# Patient Record
Sex: MALE | Race: WHITE | NOT HISPANIC OR LATINO | Employment: FULL TIME | URBAN - METROPOLITAN AREA
[De-identification: names, ages, dates, MRNs, and addresses within clinical notes are randomized per-mention and may not be internally consistent; named-entity substitution may affect disease eponyms.]

---

## 2017-01-16 ENCOUNTER — TELEPHONE (OUTPATIENT)
Dept: SPORTS MEDICINE | Facility: CLINIC | Age: 32
End: 2017-01-16

## 2017-01-16 RX ORDER — TIZANIDINE HYDROCHLORIDE 4 MG/1
4 CAPSULE, GELATIN COATED ORAL EVERY 8 HOURS PRN
Qty: 30 CAPSULE | Refills: 0 | Status: SHIPPED | OUTPATIENT
Start: 2017-01-16 | End: 2017-03-08 | Stop reason: SDUPTHER

## 2017-01-16 NOTE — TELEPHONE ENCOUNTER
PATIENT WAS DOING SOME REMODELING AND PULLED MUSCLES IN HIS LOWER BACK. HE IS WANTING TO KNOW IF YOU WILL CALL HIM IN SOMETHING OR IF HE HAS TO BE SEEN.

## 2017-02-15 ENCOUNTER — OFFICE VISIT (OUTPATIENT)
Dept: SPORTS MEDICINE | Facility: CLINIC | Age: 32
End: 2017-02-15

## 2017-02-15 VITALS
WEIGHT: 170 LBS | HEART RATE: 78 BPM | OXYGEN SATURATION: 98 % | DIASTOLIC BLOOD PRESSURE: 60 MMHG | RESPIRATION RATE: 16 BRPM | SYSTOLIC BLOOD PRESSURE: 102 MMHG | BODY MASS INDEX: 24.34 KG/M2 | HEIGHT: 70 IN

## 2017-02-15 DIAGNOSIS — G89.29 CHRONIC MIDLINE LOW BACK PAIN WITHOUT SCIATICA: ICD-10-CM

## 2017-02-15 DIAGNOSIS — M54.50 CHRONIC MIDLINE LOW BACK PAIN WITHOUT SCIATICA: ICD-10-CM

## 2017-02-15 DIAGNOSIS — R10.13 DYSPEPSIA: Primary | ICD-10-CM

## 2017-02-15 DIAGNOSIS — L40.9 PSORIASIS: ICD-10-CM

## 2017-02-15 PROCEDURE — 99214 OFFICE O/P EST MOD 30 MIN: CPT | Performed by: FAMILY MEDICINE

## 2017-02-15 RX ORDER — BETAMETHASONE DIPROPIONATE 0.5 MG/G
CREAM TOPICAL 2 TIMES DAILY
Qty: 45 G | Refills: 2 | Status: SHIPPED | OUTPATIENT
Start: 2017-02-15 | End: 2020-09-15

## 2017-02-15 RX ORDER — ALUMINUM ZIRCONIUM OCTACHLOROHYDREX GLY 16 G/100G
1 GEL TOPICAL DAILY
Qty: 28 CAPSULE | Refills: 0 | COMMUNITY
Start: 2017-02-15 | End: 2017-12-06

## 2017-02-15 RX ORDER — PREDNISONE 10 MG/1
TABLET ORAL
Qty: 30 TABLET | Refills: 0 | Status: SHIPPED | OUTPATIENT
Start: 2017-02-15 | End: 2017-04-03 | Stop reason: SDUPTHER

## 2017-02-15 NOTE — PROGRESS NOTES
"Genesis Cullen is a 31 y.o. male.   Chief Complaint   Patient presents with   • Abdominal Pain     bowel movements are not as frequent or normal as usual   • Back Pain     wants lower back rechecked       History of Present Illness   1.  Last week patient had an episode of lower abdominal cramping and diarrhea, lasted for about 24 hours, no fever or chills, no melena or hematochezia.  Since that time has noted more regular stools but only occurring every 2-3 days, also has noted increased gas.  2.  About a month ago patient was doing some remodeling in his new home, swelling a sledgehammer and noted afterwards that he had some fairly significant low back pain that was nonradiating.  He called our office and on 1/16/2017 he was sent in a prescription for Zanaflex.  Patient states his back is quite a bit better however there still a nagging dull ache in the low back especially towards in the day.  Still no radiation.    3.  Patient needs a refill on Diprolene cream for patches of psoriasis on his elbows.    I have reviewed the patient's medical history in detail and updated the computerized patient record.        Review of Systems   Constitutional: Negative.    HENT: Negative.    Respiratory: Negative.    Cardiovascular: Negative.    Gastrointestinal:        Per history of present illness   Genitourinary: Negative.    Musculoskeletal: Positive for back pain.   Skin:        Per history of present illness   Neurological: Negative.    Psychiatric/Behavioral: Negative.      Visit Vitals   • /60 (BP Location: Left arm, Patient Position: Sitting, Cuff Size: Adult)   • Pulse 78   • Resp 16   • Ht 70\" (177.8 cm)   • Wt 170 lb (77.1 kg)   • SpO2 98%   • BMI 24.39 kg/m2         Objective   Physical Exam   Constitutional: He is oriented to person, place, and time. He appears well-developed and well-nourished.   HENT:   Head: Normocephalic and atraumatic.   Mouth/Throat: Oropharynx is clear and moist.   Eyes: " Conjunctivae and EOM are normal. Pupils are equal, round, and reactive to light.   Cardiovascular: Normal rate and regular rhythm.    No peripheral edema   Pulmonary/Chest: Effort normal and breath sounds normal. He has no wheezes.   Abdominal: Soft. Bowel sounds are normal. He exhibits no distension. There is no tenderness.   Musculoskeletal:   Examination lumbar spine normal in general appearance.  There is some mild discomfort with back extension, midline.  No radiating pain.  Mild discomfort with forward flexion to approximately 90° and then extension.   Neurological: He is alert and oriented to person, place, and time.   Skin: Skin is warm and dry.   Patches of dry silvery scaly skin with a red base just distal to both elbows.   Psychiatric: He has a normal mood and affect. His behavior is normal.   Vitals reviewed.      Assessment/Plan   Robert was seen today for abdominal pain and back pain.    Diagnoses and all orders for this visit:    Dyspepsia  -     Probiotic Product (ALIGN) capsule; Take 1 capsule by mouth Daily.    Chronic midline low back pain without sciatica  -     predniSONE (DELTASONE) 10 MG tablet; 4 tabs qd x 3 d, then 3 tabs qd x 3 d, then 2 tabs qd x 3 d, then 1 tab qd  x 3 d    Psoriasis  -     betamethasone dipropionate (DIPROLENE) 0.05 % cream; Apply  topically 2 (Two) Times a Day.      1.  Dyspepsia, we'll treat with a probiotic however if his symptoms do not resolve or become a recurring issue he will need to be sent for colonoscopy to rule out IBD (patient with a history of psoriasis).  2.  Mechanical low back pain, follow-up 2 weeks if no change.  3.  Psoriasis, follow-up one month if no change.

## 2017-03-08 RX ORDER — TIZANIDINE HYDROCHLORIDE 4 MG/1
4 CAPSULE, GELATIN COATED ORAL EVERY 8 HOURS PRN
Qty: 30 CAPSULE | Refills: 0 | Status: SHIPPED | OUTPATIENT
Start: 2017-03-08 | End: 2018-09-18

## 2017-03-13 ENCOUNTER — TELEPHONE (OUTPATIENT)
Dept: SPORTS MEDICINE | Facility: CLINIC | Age: 32
End: 2017-03-13

## 2017-03-14 RX ORDER — TRIAMCINOLONE ACETONIDE 0.25 MG/G
CREAM TOPICAL 2 TIMES DAILY
Qty: 80 G | Refills: 1 | Status: SHIPPED | OUTPATIENT
Start: 2017-03-14 | End: 2017-12-06

## 2017-03-14 NOTE — TELEPHONE ENCOUNTER
I called and told him you will not fill Vanos and that you can refer him to dermatology. But he is asking if you will call in something different. The one med was over $100 and insurance would not cover it.

## 2017-04-03 ENCOUNTER — TELEPHONE (OUTPATIENT)
Dept: SPORTS MEDICINE | Facility: CLINIC | Age: 32
End: 2017-04-03

## 2017-04-03 DIAGNOSIS — M54.50 CHRONIC MIDLINE LOW BACK PAIN WITHOUT SCIATICA: ICD-10-CM

## 2017-04-03 DIAGNOSIS — G89.29 CHRONIC MIDLINE LOW BACK PAIN WITHOUT SCIATICA: ICD-10-CM

## 2017-04-03 RX ORDER — PREDNISONE 10 MG/1
TABLET ORAL
Qty: 30 TABLET | Refills: 0 | Status: SHIPPED | OUTPATIENT
Start: 2017-04-03 | End: 2017-12-06

## 2017-04-03 NOTE — TELEPHONE ENCOUNTER
Pt would like refill on prednisone for his back that he was given a couple of months ago, he thinks he pulled it helping his parents move a tv this weekend

## 2017-09-14 ENCOUNTER — TREATMENT (OUTPATIENT)
Dept: PHYSICAL THERAPY | Facility: CLINIC | Age: 32
End: 2017-09-14

## 2017-09-14 DIAGNOSIS — R51.9 HEADACHE, UNSPECIFIED HEADACHE TYPE: Primary | ICD-10-CM

## 2017-09-14 PROCEDURE — 97110 THERAPEUTIC EXERCISES: CPT | Performed by: PHYSICAL THERAPIST

## 2017-09-14 PROCEDURE — 97161 PT EVAL LOW COMPLEX 20 MIN: CPT | Performed by: PHYSICAL THERAPIST

## 2017-09-14 PROCEDURE — DRYNDL PR CUSTOM DRY NEEDLING SELF PAY: Performed by: PHYSICAL THERAPIST
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
## 2017-09-14 NOTE — PATIENT INSTRUCTIONS
Pathology and involved anatomy  Purpose of treatment  HEP performance  Please view My Rehab Pro Robert Cullen for a complete list of HEP instructions.  Dry needling purpose, benefits, risks, side effects; consent reviewed, signed and to be scanned to Epic

## 2017-09-20 ENCOUNTER — APPOINTMENT (OUTPATIENT)
Dept: PHYSICAL THERAPY | Facility: HOSPITAL | Age: 32
End: 2017-09-20

## 2017-09-21 ENCOUNTER — TREATMENT (OUTPATIENT)
Dept: PHYSICAL THERAPY | Facility: CLINIC | Age: 32
End: 2017-09-21

## 2017-09-21 DIAGNOSIS — R51.9 HEADACHE, UNSPECIFIED HEADACHE TYPE: Primary | ICD-10-CM

## 2017-09-21 PROCEDURE — 97110 THERAPEUTIC EXERCISES: CPT | Performed by: PHYSICAL THERAPIST

## 2017-09-21 PROCEDURE — DRYNDL PR CUSTOM DRY NEEDLING SELF PAY: Performed by: PHYSICAL THERAPIST

## 2017-09-21 PROCEDURE — 97014 ELECTRIC STIMULATION THERAPY: CPT | Performed by: PHYSICAL THERAPIST

## 2017-09-21 PROCEDURE — 97112 NEUROMUSCULAR REEDUCATION: CPT | Performed by: PHYSICAL THERAPIST

## 2017-09-21 NOTE — PROGRESS NOTES
"Physical Therapy Daily Progress Note    Time In 1404  Time Out 1501    Robert Gonzalezht reports: soreness after last dry needling treatment and improvement the next day.  Pt reports \"kink\" in neck since yesterday.  Pt reports 1 bad headache the other night that was managed with medication and Epson salt bath.     Subjective     Objective     General Comments     Spine Comments  Thoracic CPA's T4-5, T6-7, T8-9, T9-10 hypomobile and pain-free     See Exercise, Manual, and Modality Logs for complete treatment.     Assessment & Plan     Assessment  Assessment details: Pt required review of HEP performance today.  Hypomobility noted in thoracic spine and manual therapy and therapeutic exercises initiated to increase pain-free mobility in thoracic spine.  Progressed cervical stabilization exercise with UE resisted movement.  Pt tolerated treatment session well without pain.       Progress strengthening /stabilization /functional activity         Manual Therapy:    -     mins  10860;  Therapeutic Exercise:    20     mins  03956;     Neuromuscular Tiffani:    10    mins  21538;    Therapeutic Activity:     -     mins  34820;     Gait Training:      -     mins  40922;     Ultrasound:     -     mins  73715;    Electrical Stimulation:    15     mins  21280 ( );  Dry Needling     10     mins self-pay    Timed Treatment:   30   mins   Total Treatment:     57   mins    Latoya Montero, PT, DPT  Physical Therapist    "

## 2017-09-26 ENCOUNTER — TREATMENT (OUTPATIENT)
Dept: PHYSICAL THERAPY | Facility: CLINIC | Age: 32
End: 2017-09-26

## 2017-09-26 DIAGNOSIS — R51.9 HEADACHE, UNSPECIFIED HEADACHE TYPE: Primary | ICD-10-CM

## 2017-09-26 PROCEDURE — 97112 NEUROMUSCULAR REEDUCATION: CPT | Performed by: PHYSICAL THERAPIST

## 2017-09-26 PROCEDURE — 97110 THERAPEUTIC EXERCISES: CPT | Performed by: PHYSICAL THERAPIST

## 2017-09-26 PROCEDURE — 97014 ELECTRIC STIMULATION THERAPY: CPT | Performed by: PHYSICAL THERAPIST

## 2017-09-26 PROCEDURE — DRYNDL PR CUSTOM DRY NEEDLING SELF PAY: Performed by: PHYSICAL THERAPIST

## 2017-09-26 NOTE — PROGRESS NOTES
Physical Therapy Daily Progress Note    Time In 1301  Time Out 1405    Robert Alyse reports: achey/soreness in bilateral upper trapezius after last treatment and had some headaches the past couple days which may be associated with taking new medication for dermatologist medication.     Subjective     Objective   See Exercise, Manual, and Modality Logs for complete treatment.       Assessment & Plan     Assessment  Assessment details: Decreased dry needling dose to bilateral upper trapezius due to reports of soreness following last treatment.  Initiated additional shoulder and scapular strengthening with exercises shown to decrease upper trapezius activation.  Pt tolerated treatment session well with fatigue and no increased pain.  Pt required mild verbal and tactile cues for scapular depression during therapeutic exercises.      Progress per Plan of Care           Manual Therapy:    -     mins  82362;  Therapeutic Exercise:    25     mins  86155;     Neuromuscular Tiffani:    10    mins  82158;    Therapeutic Activity:     -     mins  36196;     Gait Training:      -     mins  34227;     Ultrasound:     -     mins  55135;    Electrical Stimulation:    15     mins  58256 ( );  Dry Needling     10     mins self-pay    Timed Treatment:   35   mins   Total Treatment:     64   mins    Latoya Montero, PT, DPT  Physical Therapist

## 2017-10-03 ENCOUNTER — TREATMENT (OUTPATIENT)
Dept: PHYSICAL THERAPY | Facility: CLINIC | Age: 32
End: 2017-10-03

## 2017-10-03 DIAGNOSIS — G89.29 CHRONIC MIDLINE LOW BACK PAIN WITHOUT SCIATICA: ICD-10-CM

## 2017-10-03 DIAGNOSIS — R51.9 HEADACHE, UNSPECIFIED HEADACHE TYPE: Primary | ICD-10-CM

## 2017-10-03 DIAGNOSIS — M54.50 CHRONIC MIDLINE LOW BACK PAIN WITHOUT SCIATICA: ICD-10-CM

## 2017-10-03 PROCEDURE — 97110 THERAPEUTIC EXERCISES: CPT | Performed by: PHYSICAL THERAPIST

## 2017-10-03 PROCEDURE — 97014 ELECTRIC STIMULATION THERAPY: CPT | Performed by: PHYSICAL THERAPIST

## 2017-10-03 PROCEDURE — 97112 NEUROMUSCULAR REEDUCATION: CPT | Performed by: PHYSICAL THERAPIST

## 2017-10-03 PROCEDURE — 97164 PT RE-EVAL EST PLAN CARE: CPT | Performed by: PHYSICAL THERAPIST

## 2017-10-03 NOTE — PROGRESS NOTES
Re-Assessment / Re-Certification    Time In 1353     Time Out 1506    Patient: Robert Cullen   : 1985  Diagnosis/ICD-10 Code:  Headache, unspecified headache type [R51]  Referring practitioner: LONNIE Coughlin  Date of Initial Visit: 10/3/2017  Today's Date: 10/3/2017  Patient seen for 4 sessions    OSWESTRY- BACK    Please answer every section and juliana in each section only the ONE answer which applies to you or most closely describes your problem.  Thank you.    Section 1- Pain Intensity  [x]  The pain comes and goes and is very mild.  []  The pain is mild and does not vary much.  []  The pain comes and goes and is moderate.  []  The pain is moderate and does not vary much.  []  The pain comes and goes and is severe.  []  The pain is severe and does not vary much.    Section 2- Personal Care  [x]  I would not have to change my way of washing or dressing in order to avoid pain.  []  I do not normally change my way of washing or dressing even though it causes      some pain.  []  Washing and dressing increases the pain but I manage not to change my way of doing it.  []  Washing and dressing increase the pain and I find it necessary to change my way of doing it.  []  Because of the pain I am unable to do some washing and dressing without help.  []  Because of the pain I am unable to do any washing or dressing without help  .  Section 3- Lifting  []  I can lift heavy weight without extra pain.  [x]  I can lift heavy weight but it causes extra pain.  []  Pain prevents me lifting heavy weight off the floor.  []  Pain prevents me lifting heavy weight off the floor but I can manage if they are conveniently positioned, e.g., on a table.  []  Pain prevents me from lifting heavy weight but I can manage light to medium weight if it is conveniently positioned.  []  I can only lift very light weight at the most.    Section 4- Walking  [x] I have no pain on walking.  [] I have some pain on walking but it does not  increase with distance.  [] I cannot walk more than One Mile without increasing pain.  [] I cannot walk more than 1/2 Mile without increasing pain.  [] I cannot walk more than 1/4 Mile without increasing pain.  [] I cannot walk at all without increasing pain.    Section 5- Sitting  [x] I can sit in any chair as long as I like.  [] I can sit only in my favorite chair as long as I like.  [] Pain prevents me from sitting more than one hour.  [] Pain prevents me from sitting more than 1/2 hour.  [] Pain prevents me from sitting more than 10 minutes.  [] I avoid sitting because it increases pain straight away.      Section 6- Standing  [x] I can stand as long as I want without pain.  [] I have some pain on standing but it does not increase with time.  [] I cannot stand for longer than one hour without increasing pain.  [] I cannot stand for longer than 1/2 hour without increasing pain.  [] I cannot stand for longer than 10 minutes without increasing pain.  [] I avoid standing because it increases the pain straight away.    Section 7- Sleeping  [] I get no pain in bed.  [x] I get pain in bed but it does not prevent me from sleeping well.  [] Because of pain my normal nights sleep is reduced by less than 1/4.  [] Because of pain my normal nights sleep is reduced by less than 1/2.  [] Because of pain my normal nights sleep is reduced by less than 3/4.  [] Pain prevents me from sleeping at all.    Section 8-Social Life  [x] My social life is normal and gives me no pain.  [] My social life is normal but increases the degree of my pain.  [] Pain has no significant effect on my social life apart from limiting my more energetic interests e.g., dancing etc.  [] Pain has restricted my social life and I do not go out very often.  [] Pain has restricted my social life to my home.  [] I have hardly any social life because of pain.    Section 9- Traveling  [x] I get no pain while traveling.  [] I get some pain while traveling but none  of my usual forms of travel make it any worse.  [] I get extra pain while traveling but it does not compel me to seek alternative forms of travel.  [] I get extra pain while traveling which compels me to seek alternative forms of travel.  [] Pain restricts all forms of travel.  [] Pain prevents all forms of travel except that done lying down.    Section 10 - Changing Degree of Pain  [] My pain is rapidly getting better.  [] My pain fluctuates but overall is definitely getting better.  [x] My pain seems to be getting better but improvement is slow at present.  [] My pain is neither getting better or worse.  [] My pain is gradually worsening.  [] My pain is rapidly worsening.                                                           Initial 6 Visits D/C Change   % Score 10%      VAS #                   Subjective:   Robert Alyse reports: continued headaches and not sure if dry needling is helping.  Pt reports continued low back pain at midline and would like to work on that.   Subjective Questionnaire: Oswestry: 10%  Clinical Progress: improved  Home Program Compliance: Yes  Treatment has included: therapeutic exercise, neuromuscular re-education, manual therapy, electrical stimulation, dry needling and moist heat    Subjective Evaluation    History of Present Illness  Date of onset: 10/3/2014  Mechanism of injury: Pt reports insidious onset of low back pain a few years ago and may be related to golf swing.     PMH: recurrent low back pain    Pain  Current pain ratin  At best pain ratin  At worst pain ratin  Location: middle of low back right > left  Quality: dull ache  Relieving factors: heat and ice (BioFreeze)  Exacerbated by: golfing for first hole or 2 aches and then improves.    Diagnostic Tests  X-ray: normal    Treatments  Previous treatment: chiropractic and medication (Naproxen, steroid pack; stretching)  Patient Goals  Patient goals for therapy: decreased pain         Objective     Static  Posture     Pelvis   Pelvis (Left): Elevated.     Active Range of Motion     Lumbar   Flexion: 70 (burning posterior knees) degrees with pain  Extension: 25 degrees   Left lateral flexion: 25 degrees   Right lateral flexion: 30 degrees     Additional Active Range of Motion Details  Gross lumbar ROM R rotation 100% pain-free L rotation 100% pain-free      Strength/Myotome Testing     Left Hip   Planes of Motion   Flexion: 4-  Abduction: 4-    Right Hip   Planes of Motion   Flexion: 4-  Abduction: 4    Tests       Thoracic   Negative slump.     Lumbar     Left   Negative passive SLR and quadrant.     Right   Negative passive SLR and quadrant.     Left Hip   Negative ANNAMARIA.     Right Hip   Negative ANNAMARIA.     Additional Tests Details  MARGO adduction drop test R (-) L (+)  Modified prone instability (+)    Lumbar CPA's L1-2 hypermobile and pain-free  Lumbar CPA's L2-3, L3-4 hypermobile and pain-full  Lumbar CPA's L4-S1 hyper mobile and pain-free     Assessment & Plan     Assessment  Impairments: abnormal or restricted ROM, impaired physical strength, lacks appropriate home exercise program and pain with function  Assessment details: Examination and treatment focused on low back pain and dysfunction due to patient request and to add low back dysfunction to current treatment plan.  Pt demonstrates signs and symptoms consistent with anterior innominate rotation on left and MARGO Left AIC pattern, and decreased lumbopelvic control/stabilization.  Pt responded well to MARGO re-positioning with improved hip mobility and (-) adduction drop test post-treatment. Pt demonstrated some directional preference for lumbar extension with LE symptoms during lumbar flexion that abolished with lumbar extension.  Pt requires skilled physical therapy to address impairments and improve function including lifting, pushing, pulling, and participation in golf without pain.  Pt reported no pain post-treatment.  Prognosis: good  Functional  Limitations: lifting and uncomfortable because of pain    Progress toward previous goals: Partially Met    New Goals  Short-term goals (STG): In 2 weeks:  1. Cervical AROM improve to right lateral flexion 50 degrees and left rotation 65 degrees for driving.    2. Sahrmann for lumbar stabilization score 0.25/5 with ability to maintain neutral spine during supine march < 90 degrees in order to improve local control during LE movement needed for stair navigation and golfing.   3. Lumbar AROM flexion 70 degrees without pain for ability to retrieve object from floor.    Long-term goals (LTG): In 4 weeks:   1. Pt will report >/= 50% reduction in frequency of headaches/migraines.  2. Bilateral lower trapezius, middle trapezius and rhomboid strength improve to 5-/5 for improved upper quarter mechanics without pain.  3. Pt will sleep through the night without disruption due to headaches.  4. NDI improve to 0% to demonstrate functional improvement.   5. YVES score improve to 0% to demonstrate functional improvement.   6. Sahrmann for lumbar stabilization score improve to 0.5/5 with ability to maintain neutral spine during supine march to 90 degrees.       Recommendations: Continue with recommendations add lumbar spine to treatment plan  Timeframe: 1 month  Prognosis to achieve goals: good    PT Signature: Latoya Montero, PT, DPT      Based upon review of the patient's progress and continued therapy plan, it is my medical opinion that Robert Cullen should continue physical therapy treatment at [facility]  [address] [address] [id number] [phone].    Signature: __________________________________  LONNIE Coughlin    Manual Therapy:    -     mins  15178;  Therapeutic Exercise:    35     mins  63308;     Neuromuscular Tiffani:    15    mins  58408;    Therapeutic Activity:     -     mins  72017;     Gait Training:      -     mins  12172;      Ultrasound:     -     mins  03636;    Electrical Stimulation:    15     mins  94787 ( );  Dry Needling     -     mins self-pay    Timed Treatment:   50   mins   Total Treatment:     73   mins    Please sign and return via fax to [phone]. Thank you, [facility].

## 2017-10-03 NOTE — PATIENT INSTRUCTIONS
Purpose of treatment  Pathology and involved anatomy  HEP performance  Please view My Rehab Pro Robert Cullen for a complete list of HEP instructions.

## 2017-12-06 ENCOUNTER — OFFICE VISIT (OUTPATIENT)
Dept: SPORTS MEDICINE | Facility: CLINIC | Age: 32
End: 2017-12-06

## 2017-12-06 VITALS
OXYGEN SATURATION: 99 % | HEIGHT: 70 IN | HEART RATE: 112 BPM | SYSTOLIC BLOOD PRESSURE: 110 MMHG | DIASTOLIC BLOOD PRESSURE: 80 MMHG | WEIGHT: 180 LBS | BODY MASS INDEX: 25.77 KG/M2

## 2017-12-06 DIAGNOSIS — R21 RASH: ICD-10-CM

## 2017-12-06 DIAGNOSIS — K60.2 ANAL FISSURE: Primary | ICD-10-CM

## 2017-12-06 PROCEDURE — 99213 OFFICE O/P EST LOW 20 MIN: CPT | Performed by: FAMILY MEDICINE

## 2017-12-06 RX ORDER — CLOBETASOL PROPIONATE 0.46 MG/ML
SOLUTION TOPICAL
Refills: 2 | COMMUNITY
Start: 2017-11-13 | End: 2018-09-18

## 2017-12-06 RX ORDER — CLOTRIMAZOLE AND BETAMETHASONE DIPROPIONATE 10; .64 MG/G; MG/G
CREAM TOPICAL 2 TIMES DAILY
Qty: 45 G | Refills: 0 | Status: SHIPPED | OUTPATIENT
Start: 2017-12-06 | End: 2020-09-15

## 2017-12-06 RX ORDER — AMITRIPTYLINE HYDROCHLORIDE 10 MG/1
TABLET, FILM COATED ORAL
Refills: 4 | COMMUNITY
Start: 2017-10-17 | End: 2018-09-18

## 2017-12-06 RX ORDER — RIZATRIPTAN BENZOATE 10 MG/1
TABLET ORAL
Refills: 4 | COMMUNITY
Start: 2017-11-13

## 2017-12-06 NOTE — PROGRESS NOTES
"Robert is a 31 y.o. year old male    Chief Complaint   Patient presents with   • Constipation       History of Present Illness   HPI   1.  Intermittent episodes of bright red blood with pain during constipated bowel movement.  Has also noted some redness and irritation around the anus itself.  Has been using a lot of moistened wipes after bowel movement.  No rectal pain or bleeding between bowel movements.  No complaint with normal bowel movements.    I have reviewed the patient's medical, family, and social history in detail and updated the computerized patient record.    Review of Systems   Constitutional: Negative.    HENT: Negative.    Respiratory: Negative.    Cardiovascular: Negative.    Gastrointestinal: Positive for anal bleeding, constipation and rectal pain. Negative for abdominal pain, blood in stool, diarrhea and vomiting.   Skin: Positive for rash.       /80  Pulse 112  Ht 177.8 cm (70\")  Wt 81.6 kg (180 lb)  SpO2 99%  BMI 25.83 kg/m2     Physical Exam   Constitutional: He is oriented to person, place, and time. He appears well-developed and well-nourished.   HENT:   Head: Normocephalic and atraumatic.   Eyes: Conjunctivae and EOM are normal. Pupils are equal, round, and reactive to light.   Cardiovascular:   No peripheral edema   Pulmonary/Chest: Effort normal.   Abdominal: Soft. Bowel sounds are normal.   There is a small anal fissure posteriorly.  Rectal exam normal.  There is surrounding erythema and dryness of the skin around the anus itself.   Neurological: He is alert and oriented to person, place, and time.   Skin: Skin is warm and dry.   Psychiatric: He has a normal mood and affect. His behavior is normal.   Vitals reviewed.       Diagnoses and all orders for this visit:    Anal fissure  -     clotrimazole-betamethasone (LOTRISONE) 1-0.05 % cream; Apply  topically 2 (Two) Times a Day. For 2 weeks    Rash  -     clotrimazole-betamethasone (LOTRISONE) 1-0.05 % cream; Apply  topically 2 " (Two) Times a Day. For 2 weeks    Other orders  -     amitriptyline (ELAVIL) 10 MG tablet; TK 2 TS PO HS  -     clobetasol (TEMOVATE) 0.05 % external solution; APPLY TO SCALP BID PRF ITCH  -     rizatriptan (MAXALT) 10 MG tablet; TK 1 T PO AT ONSET OF HEADACHE. MAY REPEAT IN 2 HOURS. NOT TO EXCEED 2 TABLETS IN 24 HOURS OR MORE THAN 2 DAYS IN 1 WEEK      Patient will increase fiber and fluids to maintain a softer bowel movement.  We'll see if this cream will be helpful for both the rash and anal fissure.  Have advised him not to use moistened wipes.  Follow-up 2 weeks if no improvement.    EMR Dragon/Transcription disclaimer:    Much of this encounter note is an electronic transcription/translation of spoken language to printed text.  The electronic translation of spoken language may permit erroneous, or at times, nonsensical words or phrases to be inadvertently transcribed.  Although I have reviewed the note for such errors some may still exist.

## 2018-01-15 ENCOUNTER — DOCUMENTATION (OUTPATIENT)
Dept: PHYSICAL THERAPY | Facility: CLINIC | Age: 33
End: 2018-01-15

## 2018-01-15 DIAGNOSIS — R51.9 HEADACHE, UNSPECIFIED HEADACHE TYPE: Primary | ICD-10-CM

## 2018-01-15 DIAGNOSIS — M54.50 CHRONIC MIDLINE LOW BACK PAIN WITHOUT SCIATICA: ICD-10-CM

## 2018-01-15 DIAGNOSIS — G89.29 CHRONIC MIDLINE LOW BACK PAIN WITHOUT SCIATICA: ICD-10-CM

## 2018-01-15 NOTE — PROGRESS NOTES
Discharge Report    Patient Name: Robert Cullen       Patient MRN: [id number]  : 1985  Physician:  Date: 1/15/2018    Encounter Diagnoses   Name Primary?   • Headache, unspecified headache type Yes   • Chronic midline low back pain without sciatica        Treatment has included therapeutic exercise, neuromuscular re-education, manual therapy, electrical stimulation, dry needling and moist heat    Please Re-certification dated 10/03/17      Assessment: Pt canceled last scheduled PT visit and was not compliant with regularly scheduled PT visits.    Progress towards goals: Partially Met    Discharge Reason: Patient non-compliant with treatment plan or exercise program      Plan of Care: Continue with current home exercise program as instructed    Prognosis: fair    Understanding at Discharge: fair     Latoya Montero PT, DPT

## 2018-06-11 ENCOUNTER — TELEPHONE (OUTPATIENT)
Dept: SPORTS MEDICINE | Facility: CLINIC | Age: 33
End: 2018-06-11

## 2018-06-11 RX ORDER — AMOXICILLIN AND CLAVULANATE POTASSIUM 875; 125 MG/1; MG/1
1 TABLET, FILM COATED ORAL EVERY 12 HOURS SCHEDULED
Qty: 20 TABLET | Refills: 0 | Status: SHIPPED | OUTPATIENT
Start: 2018-06-11 | End: 2018-09-18

## 2018-06-11 RX ORDER — BENZONATATE 200 MG/1
200 CAPSULE ORAL 3 TIMES DAILY PRN
Qty: 30 CAPSULE | Refills: 0 | Status: SHIPPED | OUTPATIENT
Start: 2018-06-11 | End: 2018-09-18

## 2018-07-17 ENCOUNTER — TELEPHONE (OUTPATIENT)
Dept: SPORTS MEDICINE | Facility: CLINIC | Age: 33
End: 2018-07-17

## 2018-09-14 ENCOUNTER — TELEPHONE (OUTPATIENT)
Dept: SPORTS MEDICINE | Facility: CLINIC | Age: 33
End: 2018-09-14

## 2018-09-14 NOTE — TELEPHONE ENCOUNTER
Patient stated he went to  for bug bite that had gotten infected, they gave him a shot and some antibiotics. Has some questions would like a phone call.

## 2018-09-18 ENCOUNTER — OFFICE VISIT (OUTPATIENT)
Dept: SPORTS MEDICINE | Facility: CLINIC | Age: 33
End: 2018-09-18

## 2018-09-18 VITALS
SYSTOLIC BLOOD PRESSURE: 110 MMHG | BODY MASS INDEX: 26.54 KG/M2 | WEIGHT: 185.4 LBS | HEART RATE: 74 BPM | TEMPERATURE: 97.8 F | HEIGHT: 70 IN | DIASTOLIC BLOOD PRESSURE: 72 MMHG | OXYGEN SATURATION: 98 %

## 2018-09-18 DIAGNOSIS — L03.119 CELLULITIS OF FOOT: Primary | ICD-10-CM

## 2018-09-18 PROCEDURE — 99213 OFFICE O/P EST LOW 20 MIN: CPT | Performed by: FAMILY MEDICINE

## 2018-09-18 RX ORDER — BACLOFEN 10 MG/1
TABLET ORAL
Refills: 5 | COMMUNITY
Start: 2018-07-23 | End: 2019-07-11

## 2018-09-18 RX ORDER — SULFAMETHOXAZOLE AND TRIMETHOPRIM 800; 160 MG/1; MG/1
1 TABLET ORAL
COMMUNITY
Start: 2018-09-13 | End: 2018-09-18 | Stop reason: SDUPTHER

## 2018-09-18 RX ORDER — SULFAMETHOXAZOLE AND TRIMETHOPRIM 800; 160 MG/1; MG/1
1 TABLET ORAL 2 TIMES DAILY
Qty: 20 TABLET | Refills: 0 | Status: SHIPPED | OUTPATIENT
Start: 2018-09-18 | End: 2018-09-18 | Stop reason: SDUPTHER

## 2018-09-18 RX ORDER — GLYCOPYRROLATE 1 MG/1
TABLET ORAL
Refills: 0 | COMMUNITY
Start: 2018-06-21

## 2018-09-18 RX ORDER — SULFAMETHOXAZOLE AND TRIMETHOPRIM 800; 160 MG/1; MG/1
1 TABLET ORAL 2 TIMES DAILY
Qty: 20 TABLET | Refills: 0 | Status: SHIPPED | OUTPATIENT
Start: 2018-09-18 | End: 2018-09-28

## 2018-09-18 NOTE — PROGRESS NOTES
"Robert is a 32 y.o. year old male    Chief Complaint   Patient presents with   • Insect Bite       History of Present Illness   HPI   That one week ago patient noted a \"pimple\" on the dorsal aspect left fifth toe.  Patient went to urgent care center 5 days ago and was given a shot of antibiotic and started on Bactrim DS.  Patient states for the first several days he really did not see any significant improvement, he has shown me pictures on his phone in his left foot was quite swollen and erythematous up to the ankle.  2 days ago he noted another area just medial to the original \"pimple\" that was draining pus, patient expressed significant amount of pus out of this opening and since that time his foot is feeling \"1000 times better\".  Weightbearing without difficulty.  No fever or chills.    I have reviewed the patient's medical, family, and social history in detail and updated the computerized patient record.    Review of Systems   Constitutional: Negative for fever.   Skin: Negative for wound.        Per history of present illness   Neurological: Negative for numbness.   All other systems reviewed and are negative.      /72 (BP Location: Left arm, Patient Position: Sitting, Cuff Size: Adult)   Pulse 74   Temp 97.8 °F (36.6 °C) (Oral)   Ht 177.8 cm (70\")   Wt 84.1 kg (185 lb 6.4 oz)   SpO2 98%   BMI 26.60 kg/m²      Physical Exam   Constitutional: He is oriented to person, place, and time. He appears well-developed and well-nourished.   HENT:   Head: Normocephalic and atraumatic.   Eyes: Pupils are equal, round, and reactive to light. Conjunctivae and EOM are normal.   Cardiovascular:   No peripheral edema   Pulmonary/Chest: Effort normal.   Musculoskeletal:   Left foot with scabbing over the original \"pimple\", there is an area just medial and proximal to this that appears to be a draining hole that is scabbing.  He has much less erythema and is only mild over the distal lateral left foot.  No significant " warmth to this area.  No pain with palpation.  No toxic striations.   Neurological: He is alert and oriented to person, place, and time.   Skin: Skin is warm and dry.   Psychiatric: He has a normal mood and affect. His behavior is normal.   Vitals reviewed.       Diagnoses and all orders for this visit:    Cellulitis of foot  -     Discontinue: sulfamethoxazole-trimethoprim (BACTRIM DS,SEPTRA DS) 800-160 MG per tablet; Take 1 tablet by mouth 2 (Two) Times a Day for 10 days.  -     sulfamethoxazole-trimethoprim (BACTRIM DS,SEPTRA DS) 800-160 MG per tablet; Take 1 tablet by mouth 2 (Two) Times a Day for 10 days.    Other orders  -     mupirocin (BACTROBAN) 2 % ointment; Apply to left foot area twice daily  -     Discontinue: sulfamethoxazole-trimethoprim (BACTRIM DS,SEPTRA DS) 800-160 MG per tablet; Take 1 tablet by mouth.  -     baclofen (LIORESAL) 10 MG tablet; TK 1 T PO HS  -     glycopyrrolate (ROBINUL) 1 MG tablet; TK 2 TS PO BID  -     Adalimumab 10 MG/0.1ML Prefilled Syringe Kit; Inject  under the skin into the appropriate area as directed.       It appears the patient is now improving considerably, he will continue with antibiotic and soaks.  Follow-up if not completely resolved in the next week.      EMR Dragon/Transcription disclaimer:    Much of this encounter note is an electronic transcription/translation of spoken language to printed text.  The electronic translation of spoken language may permit erroneous, or at times, nonsensical words or phrases to be inadvertently transcribed.  Although I have reviewed the note for such errors some may still exist.

## 2019-01-23 ENCOUNTER — TELEPHONE (OUTPATIENT)
Dept: SPORTS MEDICINE | Facility: CLINIC | Age: 34
End: 2019-01-23

## 2019-01-24 RX ORDER — AMOXICILLIN AND CLAVULANATE POTASSIUM 875; 125 MG/1; MG/1
1 TABLET, FILM COATED ORAL 2 TIMES DAILY
Qty: 20 TABLET | Refills: 0 | Status: SHIPPED | OUTPATIENT
Start: 2019-01-24 | End: 2019-07-11

## 2019-06-13 ENCOUNTER — TELEPHONE (OUTPATIENT)
Dept: SPORTS MEDICINE | Facility: CLINIC | Age: 34
End: 2019-06-13

## 2019-06-13 NOTE — TELEPHONE ENCOUNTER
Patient called and wanted to know if he could get a steroid pack sent in for his back. States that he injured it a bit while playing golf over the weekend and he has had an ache on his lower back. Please advise, thank you!

## 2019-06-14 RX ORDER — METHYLPREDNISOLONE 4 MG/1
TABLET ORAL
Qty: 21 TABLET | Refills: 0 | Status: SHIPPED | OUTPATIENT
Start: 2019-06-14 | End: 2019-07-11

## 2019-07-11 ENCOUNTER — OFFICE VISIT (OUTPATIENT)
Dept: SPORTS MEDICINE | Facility: CLINIC | Age: 34
End: 2019-07-11

## 2019-07-11 VITALS
SYSTOLIC BLOOD PRESSURE: 110 MMHG | WEIGHT: 180 LBS | TEMPERATURE: 98.3 F | OXYGEN SATURATION: 99 % | HEIGHT: 70 IN | HEART RATE: 69 BPM | BODY MASS INDEX: 25.77 KG/M2 | DIASTOLIC BLOOD PRESSURE: 74 MMHG

## 2019-07-11 DIAGNOSIS — M54.50 ACUTE RIGHT-SIDED LOW BACK PAIN WITHOUT SCIATICA: Primary | ICD-10-CM

## 2019-07-11 DIAGNOSIS — M54.50 ACUTE RIGHT-SIDED LOW BACK PAIN WITHOUT SCIATICA: ICD-10-CM

## 2019-07-11 PROCEDURE — 99213 OFFICE O/P EST LOW 20 MIN: CPT | Performed by: FAMILY MEDICINE

## 2019-07-11 RX ORDER — GALCANEZUMAB 120 MG/ML
INJECTION, SOLUTION SUBCUTANEOUS
Refills: 1 | COMMUNITY
Start: 2019-06-07

## 2019-07-11 RX ORDER — PREDNISONE 10 MG/1
TABLET ORAL
Qty: 48 TABLET | Refills: 0 | Status: SHIPPED | OUTPATIENT
Start: 2019-07-11 | End: 2020-08-07

## 2019-07-11 RX ORDER — TIZANIDINE 4 MG/1
TABLET ORAL
Qty: 30 TABLET | Refills: 0 | Status: SHIPPED | OUTPATIENT
Start: 2019-07-11 | End: 2022-01-31

## 2019-07-11 NOTE — PROGRESS NOTES
"Robert is a 33 y.o. year old male evaluation of a problem that is new to this examiner.    Chief Complaint   Patient presents with   • Back Pain     lower back       History of Present Illness   HPI   About 1 month ago patient was bending forward to  his daughter, had acute onset right-sided low back pain.  Patient took a Medrol Dosepak which was somewhat helpful however he is still having some right-sided low back pain that is \"nagging\".  His range of motion has greatly improved since initial injury.  No radicular symptoms.  No GI/ complaints.  Discomfort okay on the right side low back and is worse with extension right lateral bending.  He is still able to play golf without discomfort during playing but has pain and stiffness the next day and in the morning.    I have reviewed the patient's medical, family, and social history in detail and updated the computerized patient record.    Review of Systems   Constitutional: Negative for fever.   Musculoskeletal:        Per HPI   Skin: Negative for wound.   Neurological: Negative for numbness.   All other systems reviewed and are negative.      /74 (BP Location: Left arm, Patient Position: Sitting, Cuff Size: Adult)   Pulse 69   Temp 98.3 °F (36.8 °C) (Oral)   Ht 177.8 cm (70\")   Wt 81.6 kg (180 lb)   SpO2 99%   BMI 25.83 kg/m²      Physical Exam   Constitutional: He is oriented to person, place, and time. He appears well-developed and well-nourished.   HENT:   Head: Normocephalic and atraumatic.   Eyes: Conjunctivae and EOM are normal. Pupils are equal, round, and reactive to light.   Cardiovascular:   No peripheral edema   Pulmonary/Chest: Effort normal.   Musculoskeletal:   Examination lumbar spine normal in general appearance.  Patient has full forward flexion, there is some tightness of the paravertebral bundles at around L4-L5 on the right when he goes from forward flexion to extension.  Also has some point tenderness in this area with extension " right lateral bending.  Negative straight leg raise.  Motor 5 out of 5.   Neurological: He is alert and oriented to person, place, and time.   Skin: Skin is warm and dry.   Psychiatric: He has a normal mood and affect. His behavior is normal.   Vitals reviewed.        Current Outpatient Medications:   •  Adalimumab 10 MG/0.1ML Prefilled Syringe Kit, Inject  under the skin into the appropriate area as directed., Disp: , Rfl:   •  betamethasone dipropionate (DIPROLENE) 0.05 % cream, Apply  topically 2 (Two) Times a Day., Disp: 45 g, Rfl: 2  •  clotrimazole-betamethasone (LOTRISONE) 1-0.05 % cream, Apply  topically 2 (Two) Times a Day. For 2 weeks, Disp: 45 g, Rfl: 0  •  glycopyrrolate (ROBINUL) 1 MG tablet, TK 2 TS PO BID, Disp: , Rfl: 0  •  rizatriptan (MAXALT) 10 MG tablet, TK 1 T PO AT ONSET OF HEADACHE. MAY REPEAT IN 2 HOURS. NOT TO EXCEED 2 TABLETS IN 24 HOURS OR MORE THAN 2 DAYS IN 1 WEEK, Disp: , Rfl: 4  •  tobramycin-dexamethasone (TOBRADEX) 0.3-0.1 % ophthalmic ointment, Apply  to eye 3 (Three) Times a Day., Disp: 3.5 g, Rfl: 0  •  EMGALITY 120 MG/ML prefilled syringe, ADMINISTER 1 INJECTION INTO THE SKIN AT THE SAME TIME EACH MONTH, Disp: , Rfl: 1  •  predniSONE (DELTASONE) 10 MG tablet, 6 tabs qd x 3 d, 4 tabs qd x 3 d, 3 tabs qd x 3 d, 2 tabs qd x 3 d,1 tab qd x 3 d, Disp: 48 tablet, Rfl: 0  •  tiZANidine (ZANAFLEX) 4 MG tablet, 1/2 to 1 q 8 h prn spasm, Disp: 30 tablet, Rfl: 0     Diagnoses and all orders for this visit:    Acute right-sided low back pain without sciatica  -     predniSONE (DELTASONE) 10 MG tablet; 6 tabs qd x 3 d, 4 tabs qd x 3 d, 3 tabs qd x 3 d, 2 tabs qd x 3 d,1 tab qd x 3 d  -     tiZANidine (ZANAFLEX) 4 MG tablet; 1/2 to 1 q 8 h prn spasm    Other orders  -     EMGALITY 120 MG/ML prefilled syringe; ADMINISTER 1 INJECTION INTO THE SKIN AT THE SAME TIME EACH MONTH       If no significant improvement over the next 4 to 5 days then will add formal physical therapy.      EMR  Dragon/Transcription disclaimer:    Much of this encounter note is an electronic transcription/translation of spoken language to printed text.  The electronic translation of spoken language may permit erroneous, or at times, nonsensical words or phrases to be inadvertently transcribed.  Although I have reviewed the note for such errors some may still exist.

## 2019-07-12 RX ORDER — TIZANIDINE 4 MG/1
TABLET ORAL
Qty: 270 TABLET | Refills: 0 | OUTPATIENT
Start: 2019-07-12

## 2020-06-11 ENCOUNTER — TELEPHONE (OUTPATIENT)
Dept: SPORTS MEDICINE | Facility: CLINIC | Age: 35
End: 2020-06-11

## 2020-06-11 RX ORDER — METHYLPREDNISOLONE 4 MG/1
TABLET ORAL
Qty: 21 TABLET | Refills: 0 | Status: SHIPPED | OUTPATIENT
Start: 2020-06-11 | End: 2020-08-07

## 2020-06-11 NOTE — TELEPHONE ENCOUNTER
Patient called and states that he has been playing a lot of golf lately and his wrist has started giving him some pain. Patient would like to get something called in for it. Please advise, thank you!

## 2020-08-07 ENCOUNTER — OFFICE VISIT (OUTPATIENT)
Dept: SPORTS MEDICINE | Facility: CLINIC | Age: 35
End: 2020-08-07

## 2020-08-07 VITALS
TEMPERATURE: 98 F | WEIGHT: 177.6 LBS | BODY MASS INDEX: 25.43 KG/M2 | HEART RATE: 74 BPM | RESPIRATION RATE: 15 BRPM | HEIGHT: 70 IN | OXYGEN SATURATION: 98 % | SYSTOLIC BLOOD PRESSURE: 113 MMHG | DIASTOLIC BLOOD PRESSURE: 84 MMHG

## 2020-08-07 DIAGNOSIS — R10.84 GENERALIZED ABDOMINAL PAIN: ICD-10-CM

## 2020-08-07 DIAGNOSIS — M25.532 LEFT WRIST PAIN: ICD-10-CM

## 2020-08-07 DIAGNOSIS — M79.642 LEFT HAND PAIN: Primary | ICD-10-CM

## 2020-08-07 PROBLEM — L40.9 PSORIASIS: Status: ACTIVE | Noted: 2020-08-07

## 2020-08-07 PROCEDURE — 99214 OFFICE O/P EST MOD 30 MIN: CPT | Performed by: FAMILY MEDICINE

## 2020-08-07 PROCEDURE — 73110 X-RAY EXAM OF WRIST: CPT | Performed by: FAMILY MEDICINE

## 2020-08-07 PROCEDURE — 73130 X-RAY EXAM OF HAND: CPT | Performed by: FAMILY MEDICINE

## 2020-08-07 RX ORDER — METHYLPREDNISOLONE 4 MG/1
TABLET ORAL
Qty: 21 TABLET | Refills: 0 | Status: SHIPPED | OUTPATIENT
Start: 2020-08-07 | End: 2020-09-15

## 2020-08-07 RX ORDER — BACLOFEN 10 MG/1
10 TABLET ORAL NIGHTLY
COMMUNITY
End: 2020-09-15

## 2020-09-15 ENCOUNTER — OFFICE VISIT (OUTPATIENT)
Dept: GASTROENTEROLOGY | Facility: CLINIC | Age: 35
End: 2020-09-15

## 2020-09-15 VITALS
OXYGEN SATURATION: 99 % | BODY MASS INDEX: 25.62 KG/M2 | DIASTOLIC BLOOD PRESSURE: 80 MMHG | TEMPERATURE: 98 F | SYSTOLIC BLOOD PRESSURE: 120 MMHG | HEIGHT: 70 IN | HEART RATE: 86 BPM | WEIGHT: 179 LBS

## 2020-09-15 DIAGNOSIS — R19.7 DIARRHEA, UNSPECIFIED TYPE: ICD-10-CM

## 2020-09-15 DIAGNOSIS — R10.84 GENERALIZED ABDOMINAL PAIN: Primary | ICD-10-CM

## 2020-09-15 PROCEDURE — 99203 OFFICE O/P NEW LOW 30 MIN: CPT | Performed by: INTERNAL MEDICINE

## 2020-09-15 RX ORDER — DICYCLOMINE HYDROCHLORIDE 10 MG/1
10 CAPSULE ORAL 3 TIMES DAILY PRN
Qty: 90 CAPSULE | Refills: 2 | Status: SHIPPED | OUTPATIENT
Start: 2020-09-15

## 2020-09-15 RX ORDER — RISANKIZUMAB-RZAA 75 MG/0.83
KIT SUBCUTANEOUS
COMMUNITY
Start: 2020-07-13

## 2020-09-15 NOTE — PROGRESS NOTES
Abdominal Cramping (Pt stated the sx only happens twice a year) and Diarrhea      HPI  Patient referred here for consultation regarding generalized abdominal pain.    Patient utilizes Skyrizi for psoriasis. Prior treatment with Humira.    Patient reports around twice per year he has episodes of abdominal cramping. He reports the cramping comes and goes for around 1-2 hours. After the cramping, he will then develop diarrhea. He reports after the diarrhea, the symptoms resolve and he feels better. He denies any rectal bleeding.    In between the episodes, he reports he feels well and overall is healthy.    Denies family history of IBD.     Review of Systems   Constitutional: Negative for appetite change, chills, diaphoresis, fatigue, fever and unexpected weight change.   HENT: Negative for dental problem, ear pain, mouth sores, rhinorrhea, sore throat and voice change.    Eyes: Negative for pain, redness and visual disturbance.   Respiratory: Negative for cough, chest tightness and wheezing.    Cardiovascular: Negative for chest pain, palpitations and leg swelling.   Endocrine: Negative for cold intolerance, heat intolerance, polydipsia, polyphagia and polyuria.   Genitourinary: Negative for dysuria, frequency, hematuria and urgency.   Musculoskeletal: Negative for arthralgias, back pain, joint swelling, myalgias and neck pain.   Skin: Negative for rash.   Allergic/Immunologic: Negative for environmental allergies, food allergies and immunocompromised state.   Neurological: Negative for dizziness, seizures, weakness, numbness and headaches.   Hematological: Does not bruise/bleed easily.   Psychiatric/Behavioral: Negative for sleep disturbance. The patient is not nervous/anxious.         I have reviewed and confirmed the accuracy of the HPI and ROS as documented by the APRN LNONIE Kim     Problem List:    Patient Active Problem List   Diagnosis   • Chronic tension-type headache, intractable   • Primary  insomnia   • Gynecomastia   • Psoriasis       Medical History:    Past Medical History:   Diagnosis Date   • Insomnia    • Migraine    • Psoriasis         Social History:    Social History     Socioeconomic History   • Marital status: Single     Spouse name: Not on file   • Number of children: Not on file   • Years of education: Not on file   • Highest education level: Not on file   Tobacco Use   • Smoking status: Never Smoker   • Smokeless tobacco: Never Used   Substance and Sexual Activity   • Alcohol use: No   • Drug use: Defer   • Sexual activity: Defer       Family History:   Family History   Problem Relation Age of Onset   • Hypertension Mother    • Thyroid disease Mother    • Hyperlipidemia Father        Surgical History:   Past Surgical History:   Procedure Laterality Date   • KNEE SURGERY           Current Outpatient Medications:   •  EMGALITY 120 MG/ML prefilled syringe, ADMINISTER 1 INJECTION INTO THE SKIN AT THE SAME TIME EACH MONTH, Disp: , Rfl: 1  •  glycopyrrolate (ROBINUL) 1 MG tablet, TK 2 TS PO BID, Disp: , Rfl: 0  •  rizatriptan (MAXALT) 10 MG tablet, TK 1 T PO AT ONSET OF HEADACHE. MAY REPEAT IN 2 HOURS. NOT TO EXCEED 2 TABLETS IN 24 HOURS OR MORE THAN 2 DAYS IN 1 WEEK, Disp: , Rfl: 4  •  Skyrizi, 150 MG Dose, 75 MG/0.83ML Prefilled Syringe Kit kit, , Disp: , Rfl:   •  tiZANidine (ZANAFLEX) 4 MG tablet, 1/2 to 1 q 8 h prn spasm, Disp: 30 tablet, Rfl: 0  •  dicyclomine (BENTYL) 10 MG capsule, Take 1 capsule by mouth 3 (Three) Times a Day As Needed (abdominal pain/diarrhea)., Disp: 90 capsule, Rfl: 2    Allergies: No Known Allergies     The following portions of the patient's history were reviewed and updated as appropriate: allergies, current medications, past family history, past medical history, past social history, past surgical history and problem list.    Vitals:    09/15/20 1401   BP: 120/80   Pulse: 86   Temp: 98 °F (36.7 °C)   SpO2: 99%         09/15/20  1401   Weight: 81.2 kg (179 lb)      Body mass index is 25.68 kg/m².      PHYSICAL EXAM:  Physical Exam  Vitals signs reviewed.   Constitutional:       Appearance: Normal appearance. He is well-developed.   HENT:      Nose: Nose normal. No nasal deformity.   Eyes:      General: No scleral icterus.  Neck:      Trachea: No tracheal deviation.   Pulmonary:      Effort: Pulmonary effort is normal. No respiratory distress.      Breath sounds: Normal breath sounds.   Abdominal:      General: Bowel sounds are normal. There is no distension.      Palpations: Abdomen is soft. Abdomen is not rigid. There is no shifting dullness.      Tenderness: There is no abdominal tenderness. There is no guarding or rebound.      Hernia: No hernia is present.   Lymphadenopathy:      Comments: No periumbilical lymphadenopathy   Skin:     General: Skin is warm.   Neurological:      Mental Status: He is alert.   Psychiatric:         Behavior: Behavior normal.             Assessment/ Plan  Robert was seen today for abdominal cramping and diarrhea.    Diagnoses and all orders for this visit:    Generalized abdominal pain    Diarrhea, unspecified type    Other orders  -     dicyclomine (BENTYL) 10 MG capsule; Take 1 capsule by mouth 3 (Three) Times a Day As Needed (abdominal pain/diarrhea).         No follow-ups on file.    Patient Instructions   For abdominal cramping and diarrhea, may use dicyclomine as needed when symptoms occur.      Discussion:  Patient for consultation regarding abdominal cramping and loose stools.  The symptoms seem functional in origin.  He does not have any weight loss or rectal bleeding or alarm symptoms.  No personal or family history of inflammatory bowel disease.  We will ahead and start him on Bentyl to use as needed when he is symptomatic.  We have also advised him to keep a log of dietary factors that he has before these episodes or other issues that might cause an exacerbation.  We will plan to follow-up with him here in 6 months.  At this  point, do not see need for endoscopic evaluation or further imaging etc.    Documentation by Marcella FLEMING acting as a scribe in the following sections on behalf of the billable provider: HPI, ROS, assessment, & plan.

## 2020-09-15 NOTE — PATIENT INSTRUCTIONS
For abdominal cramping and diarrhea, may use dicyclomine as needed when symptoms occur.    When symptoms occur, make a log to see what food was eaten in the days leading up to the episode to attempt to identify any dietary triggers.    Follow up in 6 months. Call for any new or worsening symptoms or failure to improve.

## 2021-04-05 ENCOUNTER — TELEPHONE (OUTPATIENT)
Dept: SPORTS MEDICINE | Facility: CLINIC | Age: 36
End: 2021-04-05

## 2021-04-05 RX ORDER — METHYLPREDNISOLONE 4 MG/1
TABLET ORAL
Qty: 21 TABLET | Refills: 0 | Status: SHIPPED | OUTPATIENT
Start: 2021-04-05 | End: 2021-12-03

## 2021-04-05 NOTE — TELEPHONE ENCOUNTER
Patient called and states that he was moving a table yesterday and hurt his lower left back and would like to get a prednisone pack called in to his local pharmacy. Please advise, thank you!

## 2021-11-15 ENCOUNTER — TELEPHONE (OUTPATIENT)
Dept: SPORTS MEDICINE | Facility: CLINIC | Age: 36
End: 2021-11-15

## 2021-11-15 RX ORDER — AZITHROMYCIN 250 MG/1
TABLET, FILM COATED ORAL
Qty: 6 TABLET | Refills: 0 | Status: SHIPPED | OUTPATIENT
Start: 2021-11-15 | End: 2021-12-23 | Stop reason: SDUPTHER

## 2021-11-15 RX ORDER — DEXTROMETHORPHAN HYDROBROMIDE AND PROMETHAZINE HYDROCHLORIDE 15; 6.25 MG/5ML; MG/5ML
SYRUP ORAL
Qty: 180 ML | Refills: 0 | Status: SHIPPED | OUTPATIENT
Start: 2021-11-15 | End: 2022-05-16

## 2021-11-15 NOTE — TELEPHONE ENCOUNTER
Patient called and wanted to know if you can send in something for a cough and a z-pack. He states that he has been having some congestion and ringing in his ears. When he coughs he spits out phlem and has been having a runny nose. Please advise, thank you!

## 2021-12-03 ENCOUNTER — TELEPHONE (OUTPATIENT)
Dept: SPORTS MEDICINE | Facility: CLINIC | Age: 36
End: 2021-12-03

## 2021-12-03 RX ORDER — METHYLPREDNISOLONE 4 MG/1
TABLET ORAL
Qty: 21 TABLET | Refills: 0 | Status: SHIPPED | OUTPATIENT
Start: 2021-12-03 | End: 2022-01-10 | Stop reason: SDUPTHER

## 2021-12-03 NOTE — TELEPHONE ENCOUNTER
Patient called and states that he lifted something heavy yesterday and he has been feeling sore, his back feels tight and he has already tried to take some aleve and it has not helped. Has been using ice for it but he would also like to get a steroid pack called in to his local pharmacy. Please advise, thank you!

## 2021-12-06 RX ORDER — METHYLPREDNISOLONE 4 MG/1
TABLET ORAL
Qty: 21 EACH | OUTPATIENT
Start: 2021-12-06

## 2021-12-23 RX ORDER — AZITHROMYCIN 250 MG/1
TABLET, FILM COATED ORAL
Qty: 6 TABLET | Refills: 0 | Status: SHIPPED | OUTPATIENT
Start: 2021-12-23 | End: 2022-05-16

## 2021-12-23 NOTE — TELEPHONE ENCOUNTER
Patient called and states that he would like to get a z-pack called in and he sent in the request via online. He states that he is congested and has a runny nose. I offered him to go to a little clinic that could possibly see him due to  being out. Pt states that he would rather have me sent this message to one of the physicians in the office and see if they can send something. Please advise, thank you!

## 2022-01-10 ENCOUNTER — TELEPHONE (OUTPATIENT)
Dept: SPORTS MEDICINE | Facility: CLINIC | Age: 37
End: 2022-01-10

## 2022-01-10 RX ORDER — METHYLPREDNISOLONE 4 MG/1
TABLET ORAL
Qty: 21 TABLET | Refills: 0 | Status: SHIPPED | OUTPATIENT
Start: 2022-01-10 | End: 2022-01-14 | Stop reason: ALTCHOICE

## 2022-01-10 NOTE — TELEPHONE ENCOUNTER
Caller: MARK ANTHONY ISBELL   Relationship to Patient: SELF    Phone Number: [phone]  Reason for Call: PATIENT CALLED STATING HE TWEAKED HIS BACK LAST NIGHT WHILE GETTING HIS DAUGHTER OUT OF THE CRIB, SPOKE WITH OFFICE STATED OK TO SCHEDULE, HOWEVER PATIENTS WIFE HAS COVID SO THEY ARE QUARANTINED. PATIENT WOULD LIKE TO KNOW IF DR GILLIS CAN CALL IN A PREDISONE PACK FOR HIM.

## 2022-01-10 NOTE — TELEPHONE ENCOUNTER
Caller: MARK ANTHONY ISBELL    Relationship:  SELF    Best call back number: [phone]    Requested Prescriptions:   PREDNISONE    Pharmacy where request should be sent: [facility]      Additional details provided by patient: PATIENT WAS CALLING REGARDING AN INJURY HE OBTAINED WHILE BENDING OVER    Does the patient have less than a 3 day supply:  [x] Yes  [] No    Deshawn Choi Rep   01/10/22 12:51 EST

## 2022-01-13 ENCOUNTER — TELEPHONE (OUTPATIENT)
Dept: SPORTS MEDICINE | Facility: CLINIC | Age: 37
End: 2022-01-13

## 2022-01-13 NOTE — TELEPHONE ENCOUNTER
HUB STAFF ATTEMPTED NON-CLINICAL WARM TRANSFER - NO ANSWER     Caller: Robert Cullen    Relationship to patient: Self    Best call back number: [phone]     Chief complaint: NEEDS TO CHANGE / RESCHEDULE TODAY'S 11/13/22 FOLLOW UP @ 1140    Type of visit: FOLLOW UP - LOW BACK PAIN    Requested date: PATIENT REQUESTED TELEPHONE OR VIDEO VISIT     If rescheduling, when is the original appointment: TODAY 01/13/22 @1140    Additional notes: PATIENT HAS COVID - TESTED POSITIVE THIS PAST TUES 01/11/22     THANKS

## 2022-01-14 RX ORDER — PREDNISONE 10 MG/1
TABLET ORAL
Qty: 30 TABLET | Refills: 0 | Status: SHIPPED | OUTPATIENT
Start: 2022-01-14 | End: 2022-01-31

## 2022-01-31 ENCOUNTER — OFFICE VISIT (OUTPATIENT)
Dept: SPORTS MEDICINE | Facility: CLINIC | Age: 37
End: 2022-01-31

## 2022-01-31 VITALS
DIASTOLIC BLOOD PRESSURE: 80 MMHG | OXYGEN SATURATION: 98 % | TEMPERATURE: 95.5 F | HEART RATE: 98 BPM | SYSTOLIC BLOOD PRESSURE: 132 MMHG | RESPIRATION RATE: 16 BRPM | WEIGHT: 194 LBS | HEIGHT: 70 IN | BODY MASS INDEX: 27.77 KG/M2

## 2022-01-31 DIAGNOSIS — M54.42 CHRONIC BILATERAL LOW BACK PAIN WITH BILATERAL SCIATICA: Primary | Chronic | ICD-10-CM

## 2022-01-31 DIAGNOSIS — G89.29 CHRONIC BILATERAL LOW BACK PAIN WITH BILATERAL SCIATICA: Primary | Chronic | ICD-10-CM

## 2022-01-31 DIAGNOSIS — M54.41 CHRONIC BILATERAL LOW BACK PAIN WITH BILATERAL SCIATICA: Primary | Chronic | ICD-10-CM

## 2022-01-31 DIAGNOSIS — R93.7 ABNORMAL X-RAY OF LUMBAR SPINE: ICD-10-CM

## 2022-01-31 PROCEDURE — 99214 OFFICE O/P EST MOD 30 MIN: CPT | Performed by: FAMILY MEDICINE

## 2022-01-31 PROCEDURE — 72100 X-RAY EXAM L-S SPINE 2/3 VWS: CPT | Performed by: FAMILY MEDICINE

## 2022-01-31 RX ORDER — PREDNISONE 10 MG/1
TABLET ORAL
Qty: 48 TABLET | Refills: 0 | Status: SHIPPED | OUTPATIENT
Start: 2022-01-31 | End: 2022-05-16

## 2022-01-31 RX ORDER — TIZANIDINE 4 MG/1
TABLET ORAL
Qty: 30 TABLET | Refills: 0 | Status: SHIPPED | OUTPATIENT
Start: 2022-01-31 | End: 2022-05-16

## 2022-01-31 NOTE — PROGRESS NOTES
"Chief Complaint  Back Pain (Back pain after picking up his child. )    Subjective          Robert Cullen presents to [facility]  History of Present Illness  Patient has had episodic chronic low back pain for a number of years.  Several weeks ago patient was lifting up his 9-month-old out of the crib had sudden onset sharp lower back pain bilaterally with radiation to the hips.  Treated this with a Medrol Dosepak, got somewhat better but then flared up again and treated with a longer and is on taper.  He has seen minimal improvement.  No GI/ complaints.  Pain is worse with back extension.  Also worse first thing in the morning.  Objective   Vital Signs:   /80 (BP Location: Left arm, Patient Position: Sitting, Cuff Size: Adult)   Pulse 98   Temp 95.5 °F (35.3 °C)   Resp 16   Ht 177.8 cm (70\")   Wt 88 kg (194 lb)   SpO2 98%   BMI 27.84 kg/m²     Physical Exam  Vitals reviewed.   Constitutional:       Appearance: He is well-developed.   HENT:      Head: Normocephalic and atraumatic.   Eyes:      Conjunctiva/sclera: Conjunctivae normal.      Pupils: Pupils are equal, round, and reactive to light.   Cardiovascular:      Comments: No peripheral edema  Pulmonary:      Effort: Pulmonary effort is normal.   Musculoskeletal:      Comments: Lumbar spine normal in general appearance.  Patient begins to have pain in the bilateral lower lumbar spine at around L5 once he gets past 65 degrees forward flexion, also past 10 degrees of extension and this causes pain into both hips.  Equivocal bilateral straight leg raise.  DTRs 1+ symmetric.  Motor 5 out of 5.   Skin:     General: Skin is warm and dry.   Neurological:      Mental Status: He is alert and oriented to person, place, and time.   Psychiatric:         Behavior: Behavior normal.        Result Review :                Lumbar Spine X-Ray  Indication: Pain  Views: AP and Lateral    Findings:  No fracture  No bony lesion  Normal " soft tissues  There appears to be some mild facet arthropathy at L4 and L5.  Mild retrolisthesis L5 on S1 and narrow disc space at L5-S1.  No prior studies were available for comparison.    Assessment and Plan    Diagnoses and all orders for this visit:    1. Chronic bilateral low back pain with bilateral sciatica (Primary)  -     XR Spine Lumbar 2 or 3 View  -     MRI Lumbar Spine Without Contrast; Future  -     predniSONE (DELTASONE) 10 MG tablet; 6 tabs qd x 3 d, 4 tabs qd x 3 d, 3 tabs qd x 3 d, 2 tabs qd x 3 d,1 tab qd x 3 d  Dispense: 48 tablet; Refill: 0  -     tiZANidine (ZANAFLEX) 4 MG tablet; 1/2 to 1 q 8 h prn spasm  Dispense: 30 tablet; Refill: 0    2. Abnormal x-ray of lumbar spine  -     predniSONE (DELTASONE) 10 MG tablet; 6 tabs qd x 3 d, 4 tabs qd x 3 d, 3 tabs qd x 3 d, 2 tabs qd x 3 d,1 tab qd x 3 d  Dispense: 48 tablet; Refill: 0  -     tiZANidine (ZANAFLEX) 4 MG tablet; 1/2 to 1 q 8 h prn spasm  Dispense: 30 tablet; Refill: 0        Because this episode is significantly worse than previous and with the abnormal lumbar spine x-ray will check MRI lumbar spine.  Follow-up after MRI.    Follow Up   No follow-ups on file.  Patient was given instructions and counseling regarding his condition or for health maintenance advice. Please see specific information pulled into the AVS if appropriate.

## 2022-02-07 ENCOUNTER — TELEPHONE (OUTPATIENT)
Dept: SPORTS MEDICINE | Facility: CLINIC | Age: 37
End: 2022-02-07

## 2022-02-07 ENCOUNTER — APPOINTMENT (OUTPATIENT)
Dept: MRI IMAGING | Facility: HOSPITAL | Age: 37
End: 2022-02-07

## 2022-02-07 NOTE — TELEPHONE ENCOUNTER
Caller: MARK ANTHONY ISBELL     Relationship: SELF    Best call back number: [phone]    [facility]    Who is requesting this form or medical record from you: [facility]    How would you like to receive the form or medical records (pick-up, mail, fax): [phone]      Timeframe paperwork needed: ASAP    Additional notes: NA

## 2022-02-07 NOTE — TELEPHONE ENCOUNTER
Caller: MARK ANTHONY ISBELL    Relationship: SELF    Best call back number: [phone]    What orders are you requesting (i.e. lab or imaging): MRI OF LUMBAR SPINE    In what timeframe would the patient need to come in: ASAP    Where will you receive your lab/imaging services: [facility]    Additional notes: NEEDS ORDER SENT OVER SO THEY CAN GET HIM SCHEDULED

## 2022-02-09 NOTE — TELEPHONE ENCOUNTER
I printed the order for the patient, can you call [facility] get a fax number so I can fax this order over? Thank you!

## 2022-02-23 ENCOUNTER — TELEPHONE (OUTPATIENT)
Dept: SPORTS MEDICINE | Facility: CLINIC | Age: 37
End: 2022-02-23

## 2022-02-23 DIAGNOSIS — M54.41 CHRONIC BILATERAL LOW BACK PAIN WITH BILATERAL SCIATICA: Primary | ICD-10-CM

## 2022-02-23 DIAGNOSIS — G89.29 CHRONIC BILATERAL LOW BACK PAIN WITH BILATERAL SCIATICA: Primary | ICD-10-CM

## 2022-02-23 DIAGNOSIS — M54.42 CHRONIC BILATERAL LOW BACK PAIN WITH BILATERAL SCIATICA: Primary | ICD-10-CM

## 2022-02-23 NOTE — TELEPHONE ENCOUNTER
Hub staff attempted to follow warm transfer process and was unsuccessful - NO ANSWER     Caller: PATIENT     Relationship to patient: SELF    Best call back number:[phone]    Patient is needing: PT. RETURNING A CALL TO [facility]. HE HAS MORE QUESTIONS ABOUT HIS MRI.   PLEASE CALL TO ADVISE.

## 2022-02-23 NOTE — TELEPHONE ENCOUNTER
Patient called and wanted to know if  has reviewed his MRI of his back and would like to know if there is anything he should do in regards to this.     I informed the patient I would call him back once we get that information. He verbalized understanding. Thank you!

## 2022-03-01 DIAGNOSIS — M54.41 CHRONIC BILATERAL LOW BACK PAIN WITH BILATERAL SCIATICA: Primary | ICD-10-CM

## 2022-03-01 DIAGNOSIS — M54.42 CHRONIC BILATERAL LOW BACK PAIN WITH BILATERAL SCIATICA: Primary | ICD-10-CM

## 2022-03-01 DIAGNOSIS — G89.29 CHRONIC BILATERAL LOW BACK PAIN WITH BILATERAL SCIATICA: Primary | ICD-10-CM

## 2022-03-01 RX ORDER — CELECOXIB 200 MG/1
200 CAPSULE ORAL DAILY
Qty: 30 CAPSULE | Refills: 11 | Status: SHIPPED | OUTPATIENT
Start: 2022-03-01 | End: 2023-03-20

## 2022-03-09 ENCOUNTER — APPOINTMENT (OUTPATIENT)
Dept: MRI IMAGING | Facility: HOSPITAL | Age: 37
End: 2022-03-09

## 2022-05-16 ENCOUNTER — OFFICE VISIT (OUTPATIENT)
Dept: SPORTS MEDICINE | Facility: CLINIC | Age: 37
End: 2022-05-16

## 2022-05-16 VITALS
TEMPERATURE: 98 F | HEART RATE: 75 BPM | DIASTOLIC BLOOD PRESSURE: 70 MMHG | HEIGHT: 70 IN | SYSTOLIC BLOOD PRESSURE: 124 MMHG | BODY MASS INDEX: 26.48 KG/M2 | OXYGEN SATURATION: 98 % | WEIGHT: 185 LBS

## 2022-05-16 DIAGNOSIS — M54.41 CHRONIC BILATERAL LOW BACK PAIN WITH BILATERAL SCIATICA: Primary | Chronic | ICD-10-CM

## 2022-05-16 DIAGNOSIS — G89.29 CHRONIC BILATERAL LOW BACK PAIN WITH BILATERAL SCIATICA: Primary | Chronic | ICD-10-CM

## 2022-05-16 DIAGNOSIS — M54.42 CHRONIC BILATERAL LOW BACK PAIN WITH BILATERAL SCIATICA: Primary | Chronic | ICD-10-CM

## 2022-05-16 DIAGNOSIS — M51.36 DEGENERATIVE DISC DISEASE, LUMBAR: Chronic | ICD-10-CM

## 2022-05-16 PROCEDURE — 99212 OFFICE O/P EST SF 10 MIN: CPT | Performed by: FAMILY MEDICINE

## 2022-05-16 NOTE — PROGRESS NOTES
"Chief Complaint  Back Pain (LOW BACK PAIN- states that he had an MRI a while back and is still having soreness in his low back. )    Subjective          Robert Cullen presents to [facility]  History of Present Illness  Follow-up lumbar radiculitis.  Patient had MRI in February of this year showing degenerative changes and a small disc bulge or small herniation at L5-S1.  He has finished physical therapy and states his back is doing really very well no current pain.  Was able to play golf yesterday and shot 71.  Objective   Vital Signs:  /70 (BP Location: Left arm, Patient Position: Sitting, Cuff Size: Adult)   Pulse 75   Temp 98 °F (36.7 °C) (Temporal)   Ht 177.8 cm (70\")   Wt 83.9 kg (185 lb)   SpO2 98%   BMI 26.54 kg/m²           Physical Exam  Vitals reviewed.   Constitutional:       Appearance: He is well-developed.   HENT:      Head: Normocephalic and atraumatic.   Eyes:      Conjunctiva/sclera: Conjunctivae normal.      Pupils: Pupils are equal, round, and reactive to light.   Cardiovascular:      Comments: No peripheral edema  Pulmonary:      Effort: Pulmonary effort is normal.   Skin:     General: Skin is warm and dry.   Neurological:      Mental Status: He is alert and oriented to person, place, and time.   Psychiatric:         Behavior: Behavior normal.        Result Review :                MRI Lumbar Spine Without Contrast (02/21/2022)    Assessment and Plan    Diagnoses and all orders for this visit:    1. Chronic bilateral low back pain with bilateral sciatica (Primary)    2. Degenerative disc disease, lumbar      Recommend patient continue to be active, use anti-inflammatories only as needed.       Follow Up   No follow-ups on file.  Patient was given instructions and counseling regarding his condition or for health maintenance advice. Please see specific information pulled into the AVS if appropriate.       "

## 2022-11-17 ENCOUNTER — TELEPHONE (OUTPATIENT)
Dept: SPORTS MEDICINE | Facility: CLINIC | Age: 37
End: 2022-11-17

## 2022-11-17 NOTE — TELEPHONE ENCOUNTER
IMAGING DISC - L SPINE DONE 02/21/2022 AT [facility]     DISC RETURNED TO PATIENT VIA MAIL     THANKS  DERRELL

## 2023-03-19 DIAGNOSIS — M54.41 CHRONIC BILATERAL LOW BACK PAIN WITH BILATERAL SCIATICA: ICD-10-CM

## 2023-03-19 DIAGNOSIS — M54.42 CHRONIC BILATERAL LOW BACK PAIN WITH BILATERAL SCIATICA: ICD-10-CM

## 2023-03-19 DIAGNOSIS — G89.29 CHRONIC BILATERAL LOW BACK PAIN WITH BILATERAL SCIATICA: ICD-10-CM

## 2023-03-20 RX ORDER — CELECOXIB 200 MG/1
200 CAPSULE ORAL DAILY
Qty: 30 CAPSULE | Refills: 11 | Status: SHIPPED | OUTPATIENT
Start: 2023-03-20

## 2023-07-03 ENCOUNTER — TELEPHONE (OUTPATIENT)
Dept: SPORTS MEDICINE | Facility: CLINIC | Age: 38
End: 2023-07-03

## 2023-07-03 NOTE — TELEPHONE ENCOUNTER
Caller: Robert Cullen    Relationship to patient: Self    Best call back number: [phone]    Patient is needing: PATIENT STATES THAT HE TWEAKED HIS BACK PLAYING GOLF AND WOULD LIKE A PREDNISONE PACK CALLED INTO THE PHARMACY SINCE THIS IS A ONGOING ISSUE

## 2023-07-23 NOTE — TELEPHONE ENCOUNTER
Patient called stating has an infected hair in pubic are. Experiencing redness and tenderness around area. Patient requesting an abx.   Abrasions of multiple sites

## 2023-07-24 ENCOUNTER — TELEPHONE (OUTPATIENT)
Dept: NEUROLOGY | Facility: CLINIC | Age: 38
End: 2023-07-24

## 2023-07-24 NOTE — TELEPHONE ENCOUNTER
Caller: Robert Cullen    Relationship: Self    Best call back number: [phone]    What is the best time to reach you: ANY    Who are you requesting to speak with (clinical staff, provider,  specific staff member): JOSE/STAFF    What was the call regarding: PATIENT TELEPHONED TO REQUEST PRIOR AUTH FOR RX SUMATRIPTAN (IMITREX) 100. PLEASE INITIATE OR CALL TO ADVISE- THANK YOU

## 2023-07-27 ENCOUNTER — PATIENT ROUNDING (BHMG ONLY) (OUTPATIENT)
Dept: NEUROLOGY | Facility: CLINIC | Age: 38
End: 2023-07-27
Payer: COMMERCIAL

## 2023-09-18 ENCOUNTER — SPECIALTY PHARMACY (OUTPATIENT)
Dept: NEUROLOGY | Facility: CLINIC | Age: 38
End: 2023-09-18
Payer: COMMERCIAL

## 2023-10-16 ENCOUNTER — SPECIALTY PHARMACY (OUTPATIENT)
Dept: NEUROLOGY | Facility: CLINIC | Age: 38
End: 2023-10-16
Payer: COMMERCIAL

## 2023-10-16 PROBLEM — G43.009 MIGRAINE WITHOUT AURA AND WITHOUT STATUS MIGRAINOSUS, NOT INTRACTABLE: Status: ACTIVE | Noted: 2023-10-16

## 2023-10-16 RX ORDER — RIMEGEPANT SULFATE 75 MG/75MG
75 TABLET, ORALLY DISINTEGRATING ORAL EVERY OTHER DAY
Qty: 16 TABLET | Refills: 11 | Status: SHIPPED | OUTPATIENT
Start: 2023-10-16

## 2023-10-19 ENCOUNTER — SPECIALTY PHARMACY (OUTPATIENT)
Dept: NEUROLOGY | Facility: CLINIC | Age: 38
End: 2023-10-19
Payer: COMMERCIAL

## 2023-10-19 NOTE — PROGRESS NOTES
Specialty Pharmacy Patient Management Program  Neurology Initial Assessment     Robert Cullen is a 37 y.o. male with chronic migraine seen by a Neurology provider and enrolled in the Neurology Patient Management program offered by [facility].  An initial outreach was conducted, including assessment of therapy appropriateness and specialty medication education for rimegepant (Nurtec). The patient was introduced to services offered by [facility], including: regular assessments, refill coordination, curbside pick-up or mail order delivery options, prior authorization maintenance, and financial assistance programs as applicable. The patient was also provided with contact information for the pharmacy team.     Insurance Coverage & Financial Support  Rx [employer]/[place] [employer] [place]    Relevant Past Medical History and Comorbidities  Relevant medical history and concomitant health conditions were discussed with the patient. The patient's chart has been reviewed for relevant past medical history and comorbid health conditions and updated as necessary.   Past Medical History:   Diagnosis Date    Insomnia     Migraine     Psoriasis      Social History     Socioeconomic History    Marital status: Single   Tobacco Use    Smoking status: Never    Smokeless tobacco: Never   Vaping Use    Vaping Use: Never used   Substance and Sexual Activity    Alcohol use: No    Drug use: Defer    Sexual activity: Defer     Problem list reviewed by Ryan Herring RPH on 10/19/2023 at  9:11 AM      Allergies  Known allergies and reactions were discussed with the patient. The patient's chart has been reviewed for  allergy information and updated as necessary.   No Known Allergies  Allergies reviewed by Ryan Herring RPH on 10/19/2023 at  9:11 AM        Current Medication List  This medication list has been reviewed with the patient and evaluated for any interactions or necessary  modifications/recommendations, and updated to include all prescription medications, OTC medications, and supplements the patient is currently taking.  This list reflects what is contained in the patient's profile, which has also been marked as reviewed to communicate to other providers it is the most up to date version of the patient's current medication therapy.     Current Outpatient Medications:     baclofen (LIORESAL) 10 MG tablet, Take twice a day as needed for spasms, Disp: , Rfl:     dicyclomine (BENTYL) 10 MG capsule, Take 1 capsule by mouth 3 (Three) Times a Day As Needed (abdominal pain/diarrhea)., Disp: 90 capsule, Rfl: 2    glycopyrrolate (ROBINUL) 1 MG tablet, TK 2 TS PO BID, Disp: , Rfl: 0    Nurtec 75 MG dispersible tablet, Take 1 tablet by mouth Every Other Day., Disp: 16 tablet, Rfl: 11    predniSONE (DELTASONE) 10 MG tablet, 4 tabs qd x 3 d, then 3 tabs qd x 3 d, then 2 tabs qd x 3 d, then 1 tab qd  x 3 d, Disp: 30 tablet, Rfl: 0    SUMAtriptan (IMITREX) 100 MG tablet, Take 1 tablet by mouth 1 (One) Time As Needed for Migraine for up to 30 days. Take one tablet at onset of headache. May repeat dose one time in 2 hours if headache not relieved., Disp: 12 tablet, Rfl: 4    Medicines reviewed by Ryan Herring [facility] on 10/19/2023 at  9:11 AM    Drug Interactions  None identified.       Initial Education Provided for Specialty Medication  The patient has been provided with the following education and any applicable administration techniques (i.e. self-injection) have been demonstrated for the therapies indicated. All questions and concerns have been addressed prior to the patient receiving the medication, and the patient has verbalized understanding of the education and any materials provided.  Additional patient education shall be provided and documented upon request by the patient, provider or payer.      Nurtec (rimegepant) 75 mg ODT, 1 tablet by mouth every other day  Medication Expectations   Why  am I taking this medication? You are taking this medication for migraine prophylaxis.   What should I expect while on this medication? You should expect to see a decrease in the frequency and severity of your migraines.   How does the medication work? Nurtec is a small molecule that binds to calcitonin gene-related peptide (CGRP) and blocks its binding to the receptor decreasing the severity of migraines.   How long will I be on this medication for? The amount of time you will be on this medication will be determined by your doctor and your response to the medication.    How do I take this medication? Take as directed on your prescription label.   What are some possible side effects? Potential side effects including, but not limited to nausea. Patient verbalized understanding.   What happens if I miss a dose? Take the missed dose as soon as possible, and resume the every other day timed from the last dose.     Medication Safety   What are things I should warn my doctor immediately about? Hypersensitivity reactions - trouble breathing or swallowing.   What are things that I should be cautious of? Hypersensitivity reactions (eg, dyspnea, rash), including delayed serious reactions, have occurred; discontinue use if suspected    What are some medications that can interact with this one? Avoid concomitant administration of Nurtec ODT with strong inhibitors of CY, strong or moderate inducers of CYP3A or inhibitors of P-gp or BCRP. Avoid another dose of Nurtec ODT within 48 hours when it is administered with moderate inhibitors of CY. Ask your pharmacist or health care provider before starting new medications     Medication Storage/Handling   How should I handle this medication? Keep this medication out of reach of pets/children in original container. Ensure hands are dry before opening blister pack.   How does this medication need to be stored? Store at room temperature away from heat/cold, sunlight or moisture    How should I dispose of this medication? There should not be a need to dispose of this medication unless your provider decides to change the dose or therapy. If that is the case, take to your local police station for proper disposal. Some pharmacies also have take-back bins for medication drop-off.      Resources/Support   How can I remind myself to take this medication? You can download reminder apps to help you manage your refills. You may also set an alarm on your phone to remind you. The pharmacy carries pill boxes that you can place next to an area you pass everyday (such as where you place your car keys or where you charge your phone)   Is financial support available?  Yes, [employer] can provide co-pay cards if you have commercial insurance or patient assistance if you have Medicare or no insurance.    Which vaccines are recommended for me? Talk to your doctor about these vaccines: Flu, Coronavirus (COVID-19), Pneumococcal (pneumonia), Tdap, Hepatitis B, Zoster (shingles)           Adherence and Self-Administration  Adherence related to the patient's specialty therapy was discussed with the patient. The Adherence segment of this outreach has been reviewed and updated.   Is there a concern with patient's ability to self administer the medication correctly and without issue?: No  Were any potential barriers to adherence identified during the initial assessment or patient education?: No  Are there any concerns regarding the patient's understanding of the importance of medication adherence?: No  Methods for Supporting Patient Adherence and/or Self-Administration: None needed at this time.    Goals of Therapy  Goals related to the patient's specialty therapy were discussed with the patient. The Patient Goals segment of this outreach has been reviewed and updated.   Goals Addressed Today        Specialty Pharmacy General Goal      Reduce frequency of headaches and migraines. Patient reports prior to  starting Nurtec ~9/2023, 10 days of headaches within the last 30 days. 3 were severe migrainous type headaches.                  Reassessment Plan & Follow-Up  Medication Therapy Changes: starting rimegepant (Nurtec) every other day for migraine prevention per patient's neurologist.   Related Plans, Therapy Recommendations, or Therapy Problems to Be Addressed: Nothing further to be addressed at this time.   Pharmacist to perform regular reassessments no more than (6) months from the previous assessment.  Care Coordinator to set up future refill outreaches, coordinate prescription delivery, and escalate clinical questions to pharmacist.   Welcome information and patient satisfaction survey to be sent by specialty pharmacy team with patient's initial fill.    Attestation  Therapeutic appropriateness: Appropriate   I attest the patient was actively involved in and has agreed to the above plan of care. If the prescribed therapy is at any point deemed not appropriate based on the current or future assessments, a consultation will be initiated with the patient's specialty care provider to determine the best course of action. The revised plan of therapy will be documented along with any additional patient education provided. Discussed aforementioned material with patient by phone.    Ryan Herring, PharmD, [facility]  Clinic Specialty Pharmacist, Neurology  10/19/2023  09:14 EDT

## 2023-11-15 ENCOUNTER — SPECIALTY PHARMACY (OUTPATIENT)
Dept: NEUROLOGY | Facility: CLINIC | Age: 38
End: 2023-11-15
Payer: COMMERCIAL

## 2023-11-15 NOTE — PROGRESS NOTES
Specialty Pharmacy Patient Management Program  Call Center Refill Outreach      Robert is a 37 y.o. male contacted today regarding refills of his medication(s).    Specialty medication(s) and dose(s) confirmed: Nurtec ODT 75mg      Refill Questions      Flowsheet Row Most Recent Value   Changes to allergies? No   Changes to medications? No   New conditions since last clinic visit No   Unplanned office visit, urgent care, ED, or hospital admission in the last 4 weeks  No   How does patient/caregiver feel medication is working? Very good   Financial problems or insurance changes  No   Since the previous refill, were any specialty medication doses or scheduled injections missed or delayed?  No   Does this patient require a clinical escalation to a pharmacist? No                       Follow-up: 25 Days     Tatianna Hernandez CPhT  Care Coordinator  [facility] Neurology  [phone]  11/15/2023  08:52 EST

## 2023-11-21 DIAGNOSIS — G43.009 MIGRAINE WITHOUT AURA AND WITHOUT STATUS MIGRAINOSUS, NOT INTRACTABLE: ICD-10-CM

## 2023-11-21 RX ORDER — NAPROXEN 500 MG/1
TABLET ORAL
Qty: 90 TABLET | Refills: 0 | Status: SHIPPED | OUTPATIENT
Start: 2023-11-21 | End: 2023-11-27 | Stop reason: SDUPTHER

## 2023-11-22 ENCOUNTER — SPECIALTY PHARMACY (OUTPATIENT)
Dept: NEUROLOGY | Facility: CLINIC | Age: 38
End: 2023-11-22
Payer: COMMERCIAL

## 2023-11-22 RX ORDER — NAPROXEN 500 MG/1
TABLET ORAL
Qty: 90 TABLET | Refills: 0 | OUTPATIENT
Start: 2023-11-22

## 2023-11-27 RX ORDER — NAPROXEN 500 MG/1
500 TABLET ORAL 2 TIMES DAILY PRN
Qty: 60 TABLET | Refills: 2 | Status: SHIPPED | OUTPATIENT
Start: 2023-11-27

## 2023-12-12 ENCOUNTER — SPECIALTY PHARMACY (OUTPATIENT)
Dept: NEUROLOGY | Facility: CLINIC | Age: 38
End: 2023-12-12
Payer: COMMERCIAL

## 2023-12-12 NOTE — PROGRESS NOTES
Specialty Pharmacy Patient Management Program  Call Center Refill Outreach      Robert is a 37 y.o. male contacted today regarding refills of his medication(s).    Specialty medication(s) and dose(s) confirmed: Nurtec ODT 75mg      Refill Questions      Flowsheet Row Most Recent Value   Changes to allergies? No   Changes to medications? No   New conditions or infections since last clinic visit No   Unplanned office visit, urgent care, ED, or hospital admission in the last 4 weeks  No   How does patient/caregiver feel medication is working? Very good   Financial problems or insurance changes  No   Since the previous refill, were any specialty medication doses or scheduled injections missed or delayed?  No   Does this patient require a clinical escalation to a pharmacist? No            Delivery Questions      Flowsheet Row Most Recent Value   Delivery method [employer]Ex   Delivery address verified with patient/caregiver? Yes   Delivery address Home   Number of medications in delivery 1   Medication(s) being filled and delivered Rimegepant Sulfate   Doses left of specialty medications 4   Copay verified? Yes   Copay amount $0   Copay form of payment No copayment ($0)                   Follow-up: 21 Days     Tatianna Hernandez CPhT  Care Coordinator  [facility] Neurology  [phone]  12/12/2023  08:53 EST

## 2024-01-09 ENCOUNTER — SPECIALTY PHARMACY (OUTPATIENT)
Dept: NEUROLOGY | Facility: CLINIC | Age: 39
End: 2024-01-09
Payer: COMMERCIAL

## 2024-01-09 RX ORDER — BACLOFEN 10 MG/1
10 TABLET ORAL 2 TIMES DAILY PRN
Qty: 60 TABLET | Refills: 1 | Status: SHIPPED | OUTPATIENT
Start: 2024-01-09

## 2024-01-09 NOTE — PROGRESS NOTES
Specialty Pharmacy Refill Coordination Note     Robert is a 38 y.o. male contacted today regarding refills of his specialty medication(s).    Specialty medication(s) and dose(s) confirmed: rimegepant 75 mg by mouth every other day  Changes to medications: no  Changes to insurance: no      Refill Questions      Flowsheet Row Most Recent Value   Changes to allergies? No   Changes to medications? No   New conditions or infections since last clinic visit No   Unplanned office visit, urgent care, ED, or hospital admission in the last 4 weeks  No   How does patient/caregiver feel medication is working? Very good   Financial problems or insurance changes  No   Since the previous refill, were any specialty medication doses or scheduled injections missed or delayed?  No   Does this patient require a clinical escalation to a pharmacist? No            Delivery Questions      Flowsheet Row Most Recent Value   Delivery method [employer]Ex   Delivery address verified with patient/caregiver? Yes   Delivery address Home   Number of medications in delivery 2   Medication(s) being filled and delivered Rimegepant Sulfate, Baclofen (Central Muscle Relaxants)   Copay verified? Yes   Copay amount $10   Copay form of payment Credit/debit on file                 Follow-up: 3 weeks     Ryan Herring RPH  1/9/2024   16:29 EST

## 2024-02-06 ENCOUNTER — SPECIALTY PHARMACY (OUTPATIENT)
Dept: NEUROLOGY | Facility: CLINIC | Age: 39
End: 2024-02-06
Payer: COMMERCIAL

## 2024-02-06 NOTE — PROGRESS NOTES
Specialty Pharmacy Refill Coordination Note     Robert is a 38 y.o. male contacted today regarding refills of  Nurtec and Naproxen specialty medication(s).    Reviewed and verified with patient:       Specialty medication(s) and dose(s) confirmed: yes    Refill Questions      Flowsheet Row Most Recent Value   Changes to allergies? No   Changes to medications? No   New conditions or infections since last clinic visit No   Unplanned office visit, urgent care, ED, or hospital admission in the last 4 weeks  No   How does patient/caregiver feel medication is working? Very good   Financial problems or insurance changes  No   Since the previous refill, were any specialty medication doses or scheduled injections missed or delayed?  No   Does this patient require a clinical escalation to a pharmacist? No            Delivery Questions      Flowsheet Row Most Recent Value   Delivery method [employer]Ex   Delivery address verified with patient/caregiver? Yes   Delivery address Home   Number of medications in delivery 2   Medication(s) being filled and delivered Rimegepant Sulfate, Naproxen (Nonsteroidal Anti-Inflammatory Agents (Nsaids))   Copay verified? Yes   Copay amount $10   Copay form of payment Credit/debit on file                   Follow-up: 21 day(s)     Gail Fuentes, Pharmacy Technician  Specialty Pharmacy Technician

## 2024-03-11 DIAGNOSIS — M51.26 LUMBAR DISC HERNIATION: Chronic | ICD-10-CM

## 2024-03-11 DIAGNOSIS — M51.36 DEGENERATIVE DISC DISEASE, LUMBAR: Chronic | ICD-10-CM

## 2024-03-12 ENCOUNTER — OFFICE VISIT (OUTPATIENT)
Dept: SPORTS MEDICINE | Facility: CLINIC | Age: 39
End: 2024-03-12
Payer: COMMERCIAL

## 2024-03-12 VITALS
BODY MASS INDEX: 26.48 KG/M2 | HEART RATE: 93 BPM | RESPIRATION RATE: 14 BRPM | OXYGEN SATURATION: 98 % | TEMPERATURE: 98.7 F | WEIGHT: 185 LBS | DIASTOLIC BLOOD PRESSURE: 86 MMHG | HEIGHT: 70 IN | SYSTOLIC BLOOD PRESSURE: 126 MMHG

## 2024-03-12 DIAGNOSIS — G89.29 CHRONIC BILATERAL LOW BACK PAIN WITH BILATERAL SCIATICA: ICD-10-CM

## 2024-03-12 DIAGNOSIS — M51.26 LUMBAR DISC HERNIATION: Primary | Chronic | ICD-10-CM

## 2024-03-12 DIAGNOSIS — M47.816 LUMBAR FACET ARTHROPATHY: Chronic | ICD-10-CM

## 2024-03-12 DIAGNOSIS — M54.41 CHRONIC BILATERAL LOW BACK PAIN WITH BILATERAL SCIATICA: ICD-10-CM

## 2024-03-12 DIAGNOSIS — M54.42 CHRONIC BILATERAL LOW BACK PAIN WITH BILATERAL SCIATICA: ICD-10-CM

## 2024-03-12 PROCEDURE — 99214 OFFICE O/P EST MOD 30 MIN: CPT | Performed by: FAMILY MEDICINE

## 2024-03-12 RX ORDER — CELECOXIB 200 MG/1
200 CAPSULE ORAL DAILY
Qty: 30 CAPSULE | Refills: 11 | Status: SHIPPED | OUTPATIENT
Start: 2024-03-12

## 2024-03-12 RX ORDER — PREDNISONE 10 MG/1
TABLET ORAL
Qty: 30 TABLET | Refills: 0 | Status: SHIPPED | OUTPATIENT
Start: 2024-03-12

## 2024-03-12 NOTE — PROGRESS NOTES
"Chief Complaint  Pain of the Lumbar Spine (2 days ago-golt from cough and bending over to get boots)    Genesis Cullen presents to [facility]  Pain      With a history of lumbar disc degeneration with previous disc herniation and significant facet arthropathy at L4-L5 presents today with recurring back pain.  Patient states last week while playing golf he felt a little \"tweak\" in the left lower back but did not seem significant however couple days ago he was bending over to  his boots and he coughed and had increased pain with burning sensation localized to the left lower back without radicular symptoms into the legs.  No alarm symptoms.  Objective   Vital Signs:  /86 (BP Location: Right arm, Patient Position: Standing, Cuff Size: Adult)   Pulse 93   Temp 98.7 °F (37.1 °C) (Infrared)   Resp 14   Ht 177.8 cm (70\")   Wt 83.9 kg (185 lb)   SpO2 98%   BMI 26.54 kg/m²   Estimated body mass index is 26.54 kg/m² as calculated from the following:    Height as of this encounter: 177.8 cm (70\").    Weight as of this encounter: 83.9 kg (185 lb).               Physical Exam  Vitals reviewed.   Constitutional:       Appearance: He is well-developed.   HENT:      Head: Normocephalic and atraumatic.   Eyes:      Conjunctiva/sclera: Conjunctivae normal.      Pupils: Pupils are equal, round, and reactive to light.   Cardiovascular:      Comments: No peripheral edema  Pulmonary:      Effort: Pulmonary effort is normal.   Musculoskeletal:      Comments: Patient with some mild tenderness to palpation along the left side of the lumbar spine at the level of L4-L5.  Somewhat worse with back extension and left lateral bending.  Also pain with forward flexion past 60 degrees.   Skin:     General: Skin is warm and dry.   Neurological:      Mental Status: He is alert and oriented to person, place, and time.   Psychiatric:         Behavior: Behavior normal.      "   Result Review :          MRI Lumbar Spine Without Contrast (02/21/2022)            Assessment and Plan     Diagnoses and all orders for this visit:    1. Lumbar disc herniation (Primary)  -     Ambulatory Referral to Pain Management  -     Ambulatory Referral to Physical Therapy    2. Lumbar facet arthropathy  -     Ambulatory Referral to Pain Management  -     Ambulatory Referral to Physical Therapy    3. Chronic bilateral low back pain with bilateral sciatica  -     celecoxib (CeleBREX) 200 MG capsule; Take 1 capsule by mouth Daily.  Dispense: 30 capsule; Refill: 11      Of already sent in a prescription for tapering prednisone for him which always seems to work, will also have a prescription for Celebrex to use as needed (do not take with naproxen).  Will also give him a referral to physical therapy with Jelena Franklin.  Also discussed with the patient referral to pain management.        Follow Up     No follow-ups on file.  Patient was given instructions and counseling regarding his condition or for health maintenance advice. Please see specific information pulled into the AVS if appropriate.

## 2024-03-25 ENCOUNTER — SPECIALTY PHARMACY (OUTPATIENT)
Dept: NEUROLOGY | Facility: CLINIC | Age: 39
End: 2024-03-25
Payer: COMMERCIAL

## 2024-03-25 NOTE — PROGRESS NOTES
Specialty Pharmacy Refill Coordination Note     Robert is a 38 y.o. male contacted today regarding refills of  [facility] specialty medication(s).    Reviewed and verified with patient:       Specialty medication(s) and dose(s) confirmed: yes    Refill Questions      Flowsheet Row Most Recent Value   Changes to allergies? No   Changes to medications? No   New conditions or infections since last clinic visit No   Unplanned office visit, urgent care, ED, or hospital admission in the last 4 weeks  No   How does patient/caregiver feel medication is working? Very good   Financial problems or insurance changes  No   Since the previous refill, were any specialty medication doses or scheduled injections missed or delayed?  No   Does this patient require a clinical escalation to a pharmacist? No            Delivery Questions      Flowsheet Row Most Recent Value   Delivery method FedEx   Delivery address verified with patient/caregiver? Yes   Delivery address Home   Number of medications in delivery 1   Medication(s) being filled and delivered Rimegepant Sulfate   Copay verified? Yes   Copay amount $0   Copay form of payment No copayment ($0)                   Follow-up: 21 day(s)     Gail Fuentes, Pharmacy Technician  Specialty Pharmacy Technician

## 2024-03-26 NOTE — PROGRESS NOTES
"The patient has a pain history of the following:  Lumbar disc herniation  Lumbar facet arthropathy  Migraine    Previous interventions that the patient has received include:   IM steroids for psoriasis     Pain medications include:  Baclofen - migraine prevention   Celecoxib prn  Naproxen prn    Previously: Prednisone dose pack (for a flare)    Other conservative modalities which the patient reports using include:  Physical Therapy: yes  Chiropractor: yes  Massage Therapy: no  TENS: yes  Neck or back surgery: no  Past pain management: no  Ice    Past Significant Surgical History:  None     HPI:       CHIEF COMPLAINT: Back Pain      Robert Cullen is a 38 y.o. male referred here by Afshin Torres MD. Robert Cullen presents to the office for evaluation and treatment of Back Pain      History of Present Illness  Onset:  Last year  Inciting Event:  Playing golf  Recently flared after bending over to tie his shoe and cough/sneezed  Location:  Low back  Pain: Pain described as sharp and stabbing. Located in the low back and does radiate into the buttock or thigh, but usually not to his feet.  The left side is the worst.   Severity:  Pain rated as a 1 /10.  Symptoms have been constant.  Exacerbation:  Sitting or leaning backward.   Alleviation:  Standing or lying down with feet elevated.  Associated Symptoms:   He denies any new onset of bowel or bladder weakness, significant leg weakness or saddle anesthesia.  Denies balance problems or lower extremity incoordination.  Ambulates: Without assistive device.   Limitations: This pain limits the patient from golfing without pain, sitting for long periods of time.   Goals: Functional goals include ability to do the above.     He has these flares approximately once per year.  He does physical therapy and takes a steroid pack and it usually resolves.  He has mild aching constantly but when he has a flare, he has a \"lightening\" sensation.  His physical therapist " thinks that he has sacroiliac joint dysfunction.      [place] 20       PEG Assessment   What number best describes your pain on average in the past week?3  What number best describes how, during the past week, pain has interfered with your enjoyment of life?4  What number best describes how, during the past week, pain has interfered with your general activity?  9        Current Outpatient Medications:     baclofen (LIORESAL) 10 MG tablet, Take 1 tablet by mouth 2 (Two) Times a Day As Needed for Muscle Spasms. (Patient taking differently: Take 1 tablet by mouth Every Night.), Disp: 60 tablet, Rfl: 1    celecoxib (CeleBREX) 200 MG capsule, Take 1 capsule by mouth Daily., Disp: 30 capsule, Rfl: 11    dicyclomine (BENTYL) 10 MG capsule, Take 1 capsule by mouth 3 (Three) Times a Day As Needed (abdominal pain/diarrhea)., Disp: 90 capsule, Rfl: 2    glycopyrrolate (ROBINUL) 1 MG tablet, , Disp: , Rfl: 0    naproxen (NAPROSYN) 500 MG tablet, Take 1 tablet by mouth 2 (Two) Times a Day As Needed for Mild Pain or Headache., Disp: 60 tablet, Rfl: 2    Nurtec 75 MG dispersible tablet, Take 1 tablet by mouth Every Other Day., Disp: 16 tablet, Rfl: 11    SUMAtriptan (IMITREX) 100 MG tablet, Take 1 tablet by mouth 1 (One) Time As Needed for Migraine. May repeat dose one time in 2 hours if headache not relieved. Max of 2 tablets in 24 hours., Disp: 12 tablet, Rfl: 4    The following portions of the patient's history were reviewed and updated as appropriate: allergies, current medications, past family history, past medical history, past social history, past surgical history, and problem list.      REVIEW OF PERTINENT MEDICAL DATA                      Review of Systems   Constitutional:  Negative for activity change, fatigue and fever.   HENT:  Negative for congestion.    Respiratory:  Negative for cough and chest tightness.    Cardiovascular:  Negative for chest pain.   Gastrointestinal:  Negative for abdominal pain, constipation and  "diarrhea.   Genitourinary:  Negative for difficulty urinating and dysuria.   Musculoskeletal:  Positive for back pain.   Neurological:  Negative for dizziness, weakness, light-headedness, numbness and headaches.   Psychiatric/Behavioral:  Negative for agitation, sleep disturbance and suicidal ideas. The patient is not nervous/anxious.      I have reviewed and confirmed the accuracy of the ROS as documented by the MA/LPN/RN Aydee Tatum MD      Vitals:    03/28/24 0952   BP: 137/90   BP Location: Left arm   Patient Position: Sitting   Cuff Size: Adult   Pulse: 86   Resp: 18   Temp: 96.8 °F (36 °C)   TempSrc: Temporal   SpO2: 98%   Weight: 86 kg (189 lb 9.6 oz)   Height: 177.8 cm (70\")   PainSc:   1         Objective   Physical Exam  Constitutional:       General: He is not in acute distress.  Pulmonary:      Effort: Pulmonary effort is normal. No respiratory distress.   Musculoskeletal:      Comments: Ambulation: Without assistive device   Lumbar Exam:  Appearance: Scoliotic curve absent and scarring absent  Range of Motion: full range of motion  Able to tandem, toe, and heel walk: Yes  Palpated over lumbosacral paravertebral regions and transverse processes with negative tenderness appreciated, Bilateral.   Sacroiliac joints are tender, Left.  Straight leg raise is negative radiculopathy, Bilateral.  Facet loading is negative for pain, Bilateral.  Paraspinal/adjacent lumbar musculature are not tender to palpation, Bilateral.  Yaw Perla's test is negative sacroiliac pain, Bilateral.  Gaenslen's test is  positive sacroiliac pain, Left.  Yeoman's test is negative sacroiliac pain, Left.  Negative compression test   Skin:     General: Skin is warm and dry.   Neurological:      Mental Status: He is alert.   Psychiatric:         Mood and Affect: Mood normal.         Thought Content: Thought content normal.         Assessment & Plan   Diagnoses and all orders for this visit:    1. Displacement of lumbar " intervertebral disc without myelopathy (Primary)    2. Sacroiliac joint dysfunction of both sides          - Pertinent imaging reviewed.  - Explained that his pain flares seem triggered by his disc displacement, but that likely causes his sacroiliac joint to flare.    - He will contact us if he has a pain flare and we will evaluate to determine which injection will be most likely to help him at that time.    - Advised when he feels his back flaring up, to consider using his back brace then.   - If we cannot get him scheduled soon enough for an injection, we may consider an IM steroid injection versus a steroid dose pack versus management with an NSAID and muscle relaxant.     --- Follow-up PRN            Aydee Tatum MD  Pain Management    EMR Dragon/Transcription disclaimer:   Much of this encounter note is an electronic transcription/translation of spoken language to printed text. The electronic translation of spoken language may permit erroneous, or at times, nonsensical words or phrases to be inadvertently transcribed; Although I have reviewed the note for such errors, some may still exist.

## 2024-03-27 ENCOUNTER — TELEPHONE (OUTPATIENT)
Dept: PAIN MEDICINE | Facility: CLINIC | Age: 39
End: 2024-03-27
Payer: COMMERCIAL

## 2024-03-28 ENCOUNTER — OFFICE VISIT (OUTPATIENT)
Dept: PAIN MEDICINE | Facility: CLINIC | Age: 39
End: 2024-03-28
Payer: COMMERCIAL

## 2024-03-28 VITALS
OXYGEN SATURATION: 98 % | HEIGHT: 70 IN | WEIGHT: 189.6 LBS | HEART RATE: 86 BPM | DIASTOLIC BLOOD PRESSURE: 90 MMHG | SYSTOLIC BLOOD PRESSURE: 137 MMHG | TEMPERATURE: 96.8 F | BODY MASS INDEX: 27.14 KG/M2 | RESPIRATION RATE: 18 BRPM

## 2024-03-28 DIAGNOSIS — M53.3 SACROILIAC JOINT DYSFUNCTION OF BOTH SIDES: ICD-10-CM

## 2024-03-28 DIAGNOSIS — M51.26 DISPLACEMENT OF LUMBAR INTERVERTEBRAL DISC WITHOUT MYELOPATHY: Primary | ICD-10-CM

## 2024-04-18 DIAGNOSIS — M54.41 CHRONIC BILATERAL LOW BACK PAIN WITH BILATERAL SCIATICA: ICD-10-CM

## 2024-04-18 DIAGNOSIS — M54.42 CHRONIC BILATERAL LOW BACK PAIN WITH BILATERAL SCIATICA: ICD-10-CM

## 2024-04-18 DIAGNOSIS — G89.29 CHRONIC BILATERAL LOW BACK PAIN WITH BILATERAL SCIATICA: ICD-10-CM

## 2024-04-19 RX ORDER — CELECOXIB 200 MG/1
200 CAPSULE ORAL DAILY
Qty: 30 CAPSULE | Refills: 11 | Status: SHIPPED | OUTPATIENT
Start: 2024-04-19

## 2024-04-22 ENCOUNTER — SPECIALTY PHARMACY (OUTPATIENT)
Dept: NEUROLOGY | Facility: CLINIC | Age: 39
End: 2024-04-22
Payer: COMMERCIAL

## 2024-04-22 RX ORDER — BACLOFEN 10 MG/1
10 TABLET ORAL 2 TIMES DAILY PRN
Qty: 60 TABLET | Refills: 1 | Status: SHIPPED | OUTPATIENT
Start: 2024-04-22

## 2024-04-22 RX ORDER — NAPROXEN 500 MG/1
500 TABLET ORAL 2 TIMES DAILY PRN
Qty: 60 TABLET | Refills: 2 | Status: SHIPPED | OUTPATIENT
Start: 2024-04-22

## 2024-04-22 NOTE — PROGRESS NOTES
Specialty Pharmacy Refill Coordination Note     Robert is a 38 y.o. male contacted today regarding refills of his specialty medication(s).    Specialty medication(s) and dose(s) confirmed: rimegepant 75 mg by mouth every other day  Changes to medications: no  Changes to insurance: no  Reviewed and verified with patient:  Allergies  Meds  Problems         Refill Questions      Flowsheet Row Most Recent Value   Changes to allergies? No   Changes to medications? No   New conditions or infections since last clinic visit No   Unplanned office visit, urgent care, ED, or hospital admission in the last 4 weeks  No   How does patient/caregiver feel medication is working? Good   Financial problems or insurance changes  No   Since the previous refill, were any specialty medication doses or scheduled injections missed or delayed?  No   Does this patient require a clinical escalation to a pharmacist? No            Delivery Questions      Flowsheet Row Most Recent Value   Delivery method FedEx   Delivery address verified with patient/caregiver? Yes   Delivery address Home   Number of medications in delivery 4   Medication(s) being filled and delivered Rimegepant Sulfate, Naproxen (Nonsteroidal Anti-Inflammatory Agents (Nsaids)), Baclofen (Central Muscle Relaxants), Sumatriptan Succinate (Serotonin Agonists)   Doses left of specialty medications Nurtec: 5   Copay verified? Yes   Copay amount $30   Copay form of payment Credit/debit on file                 Follow-up: 3 weeks     Ryan Herring RPH  4/22/2024   12:37 EDT

## 2024-04-22 NOTE — PROGRESS NOTES
Specialty Pharmacy Patient Management Program  Neurology Reassessment     Robert Cullen is a 38 y.o. male with chronic migraine seen by a Neurology provider and enrolled in the Neurology Patient Management program offered by [facility].  A follow-up outreach was conducted, including assessment of continued therapy appropriateness, medication adherence, and side effect incidence and management for rimegepant (Nurtec).     Changes to Insurance Coverage or Financial Support  No changes.       Relevant Past Medical History and Comorbidities  Relevant medical history and concomitant health conditions were discussed with the patient. The patient's chart has been reviewed for relevant past medical history and comorbid health conditions and updated as necessary.   Past Medical History:   Diagnosis Date    Insomnia     Migraine     Psoriasis      Social History     Socioeconomic History    Marital status:    Tobacco Use    Smoking status: Never    Smokeless tobacco: Never   Vaping Use    Vaping status: Never Used   Substance and Sexual Activity    Alcohol use: No    Drug use: Never    Sexual activity: Defer     Problem list reviewed by Ryan Herring RPH on 4/22/2024 at 12:32 PM      Hospitalizations and Urgent Care Since Last Assessment  ED Visits, Admissions, or Hospitalizations: None.   Urgent Office Visits: None.       Allergies  Known allergies and reactions were discussed with the patient. The patient's chart has been reviewed for allergy information and updated as necessary.   No Known Allergies  Allergies reviewed by Ryan Herring RPH on 4/22/2024 at 12:32 PM        Lab Assessment  Labs have been reviewed. No dose adjustments are needed for the specialty medication(s) based on the labs.       Current Medication List  This medication list has been reviewed with the patient and evaluated for any interactions or necessary modifications/recommendations, and updated to include  all prescription medications, OTC medications, and supplements the patient is currently taking.  This list reflects what is contained in the patient's profile, which has also been marked as reviewed to communicate to other providers it is the most up to date version of the patient's current medication therapy.     Current Outpatient Medications:     baclofen (LIORESAL) 10 MG tablet, Take 1 tablet by mouth 2 (Two) Times a Day As Needed for Muscle Spasms., Disp: 60 tablet, Rfl: 1    naproxen (NAPROSYN) 500 MG tablet, Take 1 tablet by mouth 2 (Two) Times a Day As Needed for Mild Pain or Headache., Disp: 60 tablet, Rfl: 2    celecoxib (CeleBREX) 200 MG capsule, TAKE 1 CAPSULE BY MOUTH DAILY, Disp: 30 capsule, Rfl: 11    dicyclomine (BENTYL) 10 MG capsule, Take 1 capsule by mouth 3 (Three) Times a Day As Needed (abdominal pain/diarrhea)., Disp: 90 capsule, Rfl: 2    glycopyrrolate (ROBINUL) 1 MG tablet, , Disp: , Rfl: 0    Nurtec 75 MG dispersible tablet, Take 1 tablet by mouth Every Other Day., Disp: 16 tablet, Rfl: 11    SUMAtriptan (IMITREX) 100 MG tablet, Take 1 tablet by mouth 1 (One) Time As Needed for Migraine. May repeat dose one time in 2 hours if headache not relieved. Max of 2 tablets in 24 hours., Disp: 12 tablet, Rfl: 4    Medicines reviewed by Ryan Herring [facility] on 4/22/2024 at 12:32 PM    Drug Interactions  None identified.       Adverse Drug Reactions  Medication tolerability: Tolerating with no to minimal ADRs  Medication plan: Continue therapy with normal follow-up  Plan for ADR Management: N/A, no ADR's      Adherence, Self-Administration, and Current Therapy Problems  Adherence related patient's specialty therapy was discussed with the patient. The Adherence segment of this outreach has been reviewed and updated.   Adherence Questions  Linked Medication(s) Assessed: Rimegepant Sulfate  On average, how many doses/injections does the patient miss per month?: 0  What are the identified reasons for  non-adherence or missed doses? : no problems identified  What is the estimated medication adherence level?: %  Based on the patient/caregiver response and refill history, does this patient require an MTP to track adherence improvements?: no    Additional Barriers to Patient Self-Administration: No barriers.  Methods for Supporting Patient Self-Administration: None needed at this time.    Recently Close Medication Therapy Problems  No medication therapy recommendations to display  Open Medication Therapy Problems  No medication therapy recommendations to display     Goals of Therapy  Goals related to the patient's specialty therapy was discussed with the patient. The Patient Goals segment of this outreach has been reviewed and updated.    Goals Addressed Today        Specialty Pharmacy General Goal      Reduce frequency of headaches and migraines. Patient reports prior to starting Nurtec ~9/2023, 10 days of headaches within the last 30 days. 3 were severe migrainous type headaches.     4/22/24 clinical reassessment: Patient reports slight decrease in migraine frequency down to about 8-9 headaches and 2 severe migraines per month. Patient reports severity reduced with every other day rimegepant by about ~60% and he reports sumatriptan as needed and naproxen as needed lessen migraine symptom severity by about 60%. No side-effects.                Quality of Life Assessment   Quality of Life related to the patient's enrollment in the patient management program and services provided was discussed with the patient. The QOL segment of this outreach has been reviewed and updated.   Quality of Life Improvement Scale: 9-A good deal better      Reassessment Plan & Follow-Up  Medication Therapy Changes: No changes, continuing rimegepant (Nurtec) every other day for migraine prevention per patient's neurologist.  Related Plans, Therapy Recommendations, or Issues to Be Addressed: Nothing further to be addressed at this time.    Pharmacist to perform regular reassessments no more than (6) months from the previous assessment.  Care Coordinator to set up future refill outreaches, coordinate prescription delivery, and escalate clinical questions to pharmacist.     Attestation     I attest the patient was actively involved in and has agreed to the above plan of care. If the prescribed therapy is at any point deemed not appropriate based on the current or future assessments, a consultation will be initiated with the patient's specialty care provider to determine the best course of action. The revised plan of therapy will be documented along with any additional patient education provided. Discussed aforementioned material with patient by phone.    Ryan Herring, PharmD, [facility]  Clinic Specialty Pharmacist, Neurology  4/22/2024  12:38 EDT

## 2024-05-21 ENCOUNTER — TELEPHONE (OUTPATIENT)
Dept: NEUROLOGY | Facility: CLINIC | Age: 39
End: 2024-05-21
Payer: COMMERCIAL

## 2024-05-21 ENCOUNTER — SPECIALTY PHARMACY (OUTPATIENT)
Dept: NEUROLOGY | Facility: CLINIC | Age: 39
End: 2024-05-21
Payer: COMMERCIAL

## 2024-05-21 NOTE — TELEPHONE ENCOUNTER
Spoke to pt in regards to r/s appt due to provider being out of office. Agreed to be put onto a wait list and agreed to be r/s for August 9th.

## 2024-05-21 NOTE — PROGRESS NOTES
Specialty Pharmacy Refill Coordination Note     Robert is a 38 y.o. male contacted today regarding refills of  [facility] specialty medication(s).    Reviewed and verified with patient:       Specialty medication(s) and dose(s) confirmed: yes    Refill Questions      Flowsheet Row Most Recent Value   Changes to allergies? No   Changes to medications? No   New conditions or infections since last clinic visit No   Unplanned office visit, urgent care, ED, or hospital admission in the last 4 weeks  No   How does patient/caregiver feel medication is working? Very good   Financial problems or insurance changes  No   Since the previous refill, were any specialty medication doses or scheduled injections missed or delayed?  No   Does this patient require a clinical escalation to a pharmacist? No            Delivery Questions      Flowsheet Row Most Recent Value   Delivery method FedEx   Delivery address verified with patient/caregiver? Yes   Delivery address Home   Number of medications in delivery 1   Medication(s) being filled and delivered Rimegepant Sulfate   Doses left of specialty medications Nurtec: 5   Copay verified? Yes   Copay amount $0   Copay form of payment No copayment ($0)                   Follow-up: 21 day(s)     Gail Fuentes, Pharmacy Technician  Specialty Pharmacy Technician

## 2024-06-17 ENCOUNTER — SPECIALTY PHARMACY (OUTPATIENT)
Dept: NEUROLOGY | Facility: CLINIC | Age: 39
End: 2024-06-17
Payer: COMMERCIAL

## 2024-06-17 NOTE — PROGRESS NOTES
Specialty Pharmacy Refill Coordination Note     Robert is a 38 y.o. male contacted today regarding refills of  Nurtec, Sumatriptan and Baclofen specialty medication(s).    Reviewed and verified with patient:       Specialty medication(s) and dose(s) confirmed: yes    Refill Questions      Flowsheet Row Most Recent Value   Changes to allergies? No   Changes to medications? No   New conditions or infections since last clinic visit No   Unplanned office visit, urgent care, ED, or hospital admission in the last 4 weeks  No   How does patient/caregiver feel medication is working? Very good   Financial problems or insurance changes  No   Since the previous refill, were any specialty medication doses or scheduled injections missed or delayed?  No   Does this patient require a clinical escalation to a pharmacist? No            Delivery Questions      Flowsheet Row Most Recent Value   Delivery method FedEx   Delivery address verified with patient/caregiver? Yes   Delivery address Home   Number of medications in delivery 3   Medication(s) being filled and delivered Rimegepant Sulfate, Sumatriptan Succinate (Serotonin Agonists), Baclofen (Central Muscle Relaxants)   Copay verified? Yes   Copay amount $0   Copay form of payment No copayment ($0)                   Follow-up: 21 day(s)     Gail Fuentes, Pharmacy Technician  Specialty Pharmacy Technician

## 2024-07-17 ENCOUNTER — SPECIALTY PHARMACY (OUTPATIENT)
Dept: NEUROLOGY | Facility: CLINIC | Age: 39
End: 2024-07-17
Payer: COMMERCIAL

## 2024-07-17 NOTE — PROGRESS NOTES
Specialty Pharmacy Refill Coordination Note     Robert is a 38 y.o. male contacted today regarding refills of  [facility] specialty medication(s).    Reviewed and verified with patient:       Specialty medication(s) and dose(s) confirmed: yes    Refill Questions      Flowsheet Row Most Recent Value   Changes to allergies? No   Changes to medications? No   New conditions or infections since last clinic visit No   Unplanned office visit, urgent care, ED, or hospital admission in the last 4 weeks  No   How does patient/caregiver feel medication is working? Very good   Financial problems or insurance changes  No   Since the previous refill, were any specialty medication doses or scheduled injections missed or delayed?  No   Does this patient require a clinical escalation to a pharmacist? No            Delivery Questions      Flowsheet Row Most Recent Value   Delivery method [employer]   Delivery address verified with patient/caregiver? Yes   Delivery address Home   Number of medications in delivery 1   Medication(s) being filled and delivered Rimegepant Sulfate   Doses left of specialty medications 6   Copay verified? Yes   Copay amount $0   Copay form of payment No copayment ($0)   Ship Date 7/18   Delivery Date 7/19   Signature Required No                   Follow-up: 21 day(s)     Gail Fuentes, Pharmacy Technician  Specialty Pharmacy Technician

## 2024-08-15 ENCOUNTER — SPECIALTY PHARMACY (OUTPATIENT)
Dept: NEUROLOGY | Facility: CLINIC | Age: 39
End: 2024-08-15
Payer: COMMERCIAL

## 2024-08-15 NOTE — PROGRESS NOTES
Specialty Pharmacy Refill Coordination Note     Robert is a 38 y.o. male contacted today regarding refills of his specialty medication(s).    Specialty medication(s) and dose(s) confirmed: rimegepant 75 mg by mouth daily PRN migraines  Changes to medications: no  Changes to insurance: no      Refill Questions      Flowsheet Row Most Recent Value   Changes to allergies? No   Changes to medications? No   New conditions or infections since last clinic visit No   Unplanned office visit, urgent care, ED, or hospital admission in the last 4 weeks  No   How does patient/caregiver feel medication is working? Very good   Financial problems or insurance changes  No   Since the previous refill, were any specialty medication doses or scheduled injections missed or delayed?  No   Does this patient require a clinical escalation to a pharmacist? No            Delivery Questions      Flowsheet Row Most Recent Value   Delivery method Other (Comment)  [ups]   Delivery address verified with patient/caregiver? Yes   Delivery address Home   Number of medications in delivery 3   Medication(s) being filled and delivered Rimegepant Sulfate, Sumatriptan Succinate (Serotonin Agonists), Naproxen (Nonsteroidal Anti-Inflammatory Agents (Nsaids))   Doses left of specialty medications 7-8   Copay verified? Yes   Copay amount $20   Copay form of payment Credit/debit on file   Ship Date 8/19/24   Delivery Date 8/20/24   Signature Required No                 Follow-up: 3 weeks     Ryan Herring RPH  8/15/2024   15:44 EDT

## 2024-09-16 ENCOUNTER — SPECIALTY PHARMACY (OUTPATIENT)
Dept: NEUROLOGY | Facility: CLINIC | Age: 39
End: 2024-09-16
Payer: COMMERCIAL

## 2024-09-27 ENCOUNTER — OFFICE VISIT (OUTPATIENT)
Dept: NEUROLOGY | Facility: CLINIC | Age: 39
End: 2024-09-27
Payer: COMMERCIAL

## 2024-09-27 VITALS
SYSTOLIC BLOOD PRESSURE: 114 MMHG | BODY MASS INDEX: 27.12 KG/M2 | DIASTOLIC BLOOD PRESSURE: 78 MMHG | WEIGHT: 189 LBS | HEART RATE: 76 BPM | OXYGEN SATURATION: 97 %

## 2024-09-27 DIAGNOSIS — G43.009 MIGRAINE WITHOUT AURA AND WITHOUT STATUS MIGRAINOSUS, NOT INTRACTABLE: Primary | ICD-10-CM

## 2024-09-27 PROCEDURE — 99214 OFFICE O/P EST MOD 30 MIN: CPT | Performed by: PSYCHIATRY & NEUROLOGY

## 2024-09-27 RX ORDER — ONDANSETRON 4 MG/1
4 TABLET, ORALLY DISINTEGRATING ORAL
COMMUNITY
Start: 2024-08-17 | End: 2024-09-27

## 2024-09-27 RX ORDER — ONDANSETRON 4 MG/1
4 TABLET, FILM COATED ORAL EVERY 8 HOURS PRN
Qty: 30 TABLET | Refills: 3 | Status: SHIPPED | OUTPATIENT
Start: 2024-09-27 | End: 2024-10-27

## 2024-10-11 ENCOUNTER — SPECIALTY PHARMACY (OUTPATIENT)
Dept: NEUROLOGY | Facility: CLINIC | Age: 39
End: 2024-10-11
Payer: COMMERCIAL

## 2024-10-11 RX ORDER — RIMEGEPANT SULFATE 75 MG/75MG
75 TABLET, ORALLY DISINTEGRATING ORAL EVERY OTHER DAY
Qty: 16 TABLET | Refills: 11 | Status: SHIPPED | OUTPATIENT
Start: 2024-10-11

## 2024-10-11 NOTE — PROGRESS NOTES
Specialty Pharmacy Patient Management Program  Neurology Reassessment     Robert Cullen is a 38 y.o. male with chronic migraine seen by a Neurology provider and enrolled in the Neurology Patient Management program offered by [facility].  A follow-up outreach was conducted, including assessment of continued therapy appropriateness, medication adherence, and side effect incidence and management for rimegepant (Nurtec).     Changes to Insurance Coverage or Financial Support  No changes.       Relevant Past Medical History and Comorbidities  Relevant medical history and concomitant health conditions were discussed with the patient. The patient's chart has been reviewed for relevant past medical history and comorbid health conditions and updated as necessary.   Past Medical History:   Diagnosis Date    Insomnia     Migraine     Psoriasis      Social History     Socioeconomic History    Marital status:    Tobacco Use    Smoking status: Never    Smokeless tobacco: Never   Vaping Use    Vaping status: Never Used   Substance and Sexual Activity    Alcohol use: No    Drug use: Never    Sexual activity: Defer     Problem list reviewed by Ryan Herring RPH on 10/11/2024 at 12:33 PM      Hospitalizations and Urgent Care Since Last Assessment  ED Visits, Admissions, or Hospitalizations: None.   Urgent Office Visits: None.       Allergies  Known allergies and reactions were discussed with the patient. The patient's chart has been reviewed for allergy information and updated as necessary.   No Known Allergies  Allergies reviewed by Ryan Herring RPH on 10/11/2024 at 12:33 PM      Lab Assessment  The above labs have been reviewed. No dose adjustments are needed for the specialty medication(s) based on the labs.       Current Medication List  This medication list has been reviewed with the patient and evaluated for any interactions or necessary modifications/recommendations, and updated to  include all prescription medications, OTC medications, and supplements the patient is currently taking.  This list reflects what is contained in the patient's profile, which has also been marked as reviewed to communicate to other providers it is the most up to date version of the patient's current medication therapy.     Current Outpatient Medications:     Nurtec 75 MG dispersible tablet, Take 1 tablet by mouth Every Other Day., Disp: 16 tablet, Rfl: 11    baclofen (LIORESAL) 10 MG tablet, Take 1 tablet by mouth 2 (Two) Times a Day As Needed for Muscle Spasms., Disp: 60 tablet, Rfl: 1    celecoxib (CeleBREX) 200 MG capsule, TAKE 1 CAPSULE BY MOUTH DAILY (Patient not taking: Reported on 9/27/2024), Disp: 30 capsule, Rfl: 11    dicyclomine (BENTYL) 10 MG capsule, Take 1 capsule by mouth 3 (Three) Times a Day As Needed (abdominal pain/diarrhea). (Patient not taking: Reported on 9/27/2024), Disp: 90 capsule, Rfl: 2    glycopyrrolate (ROBINUL) 1 MG tablet, , Disp: , Rfl: 0    naproxen (NAPROSYN) 500 MG tablet, Take 1 tablet by mouth 2 (Two) Times a Day As Needed for Mild Pain or Headache., Disp: 60 tablet, Rfl: 2    ondansetron (Zofran) 4 MG tablet, Take 1 tablet by mouth Every 8 (Eight) Hours As Needed for Nausea or Vomiting for up to 30 days., Disp: 30 tablet, Rfl: 3    Risankizumab-rzaa (SKYRIZI SC), Inject  under the skin into the appropriate area as directed Every 3 (Three) Months., Disp: , Rfl:     SUMAtriptan (IMITREX) 100 MG tablet, Take 1 tablet by mouth 1 (One) Time As Needed for Migraine. May repeat dose one time in 2 hours if headache not relieved. Max of 2 tablets in 24 hours., Disp: 12 tablet, Rfl: 4    Medicines reviewed by Ryan Herring RPH on 10/11/2024 at 12:33 PM    Drug Interactions  None identified.      Adverse Drug Reactions  Medication tolerability: Tolerating with no to minimal ADRs  Medication plan: Continue therapy with normal follow-up  Plan for ADR Management: N/A, no  ADR's      Adherence, Self-Administration, and Current Therapy Problems  Adherence related patient's specialty therapy was discussed with the patient. The Adherence segment of this outreach has been reviewed and updated.   Adherence Questions  Linked Medication(s) Assessed: Rimegepant Sulfate  On average, how many doses/injections does the patient miss per month?: 0  What are the identified reasons for non-adherence or missed doses? : no problems identified  What is the estimated medication adherence level?: %  Based on the patient/caregiver response and refill history, does this patient require an MTP to track adherence improvements?: no    Additional Barriers to Patient Self-Administration: no barriers.  Methods for Supporting Patient Self-Administration: no further support needed at this time.    Recently Close Medication Therapy Problems  No medication therapy recommendations to display  Open Medication Therapy Problems  No medication therapy recommendations to display     Goals of Therapy  Goals related to the patient's specialty therapy was discussed with the patient. The Patient Goals segment of this outreach has been reviewed and updated.    Goals Addressed Today        Specialty Pharmacy General Goal      Reduce frequency of headaches and migraines. Patient reports prior to starting Nurtec ~9/2023, 10 days of headaches within the last 30 days. 3 were severe migrainous type headaches.     10/11/24: Patient reports the same continued improvement since 4/22/24 assessment with about 8 headaches and 2 severe migraines per month and ~60% severity reduction in symptoms on average. No side effects or concerns.                Quality of Life Assessment   Quality of Life related to the patient's enrollment in the patient management program and services provided was discussed with the patient. The QOL segment of this outreach has been reviewed and updated.   Quality of Life Improvement Scale: 9-A good deal  better      Reassessment Plan & Follow-Up  Medication Therapy Changes: No changes, continuing rimegepant for migraine prevention and sumatriptan for acute treatment of migraines.  Related Plans, Therapy Recommendations, or Issues to Be Addressed: Nothing further to be addressed at this time.   Pharmacist to perform regular reassessments no more than (6) months from the previous assessment.  Care Coordinator to set up future refill outreaches, coordinate prescription delivery, and escalate clinical questions to pharmacist.     Attestation  Therapeutic appropriateness: Appropriate  I attest the patient was actively involved in and has agreed to the above plan of care. If the prescribed therapy is at any point deemed not appropriate based on the current or future assessments, a consultation will be initiated with the patient's specialty care provider to determine the best course of action. The revised plan of therapy will be documented along with any additional patient education provided. Discussed aforementioned material with patient by phone.    Ryan Herring, PharmD, [facility]  Clinic Specialty Pharmacist, Neurology  10/11/2024  12:36 EDT

## 2024-10-11 NOTE — PROGRESS NOTES
Specialty Pharmacy Refill Coordination Note     Robert is a 38 y.o. male contacted today regarding refills of his specialty medication(s).    Specialty medication(s) and dose(s) confirmed: rimegepant 75 mg by mouth every other day  Changes to medications: no  Changes to insurance: no  Reviewed and verified with patient:  Allergies  Meds  Problems         Refill Questions      Flowsheet Row Most Recent Value   Changes to allergies? No   Changes to medications? No   New conditions or infections since last clinic visit No   Unplanned office visit, urgent care, ED, or hospital admission in the last 4 weeks  No   How does patient/caregiver feel medication is working? Very good   Financial problems or insurance changes  No   Since the previous refill, were any specialty medication doses or scheduled injections missed or delayed?  No   Does this patient require a clinical escalation to a pharmacist? No            Delivery Questions      Flowsheet Row Most Recent Value   Delivery method UPS   Delivery address verified with patient/caregiver? Yes   Delivery address Home   Number of medications in delivery 3   Medication(s) being filled and delivered Rimegepant Sulfate, Sumatriptan Succinate (Serotonin Agonists), Naproxen (Nonsteroidal Anti-Inflammatory Agents (Nsaids))   Doses left of specialty medications 6-8   Copay verified? Yes   Copay amount $20   Copay form of payment No copayment ($0)   Ship Date 10/14/24   Delivery Date 10/15/24   Signature Required No                 Follow-up: 3 weeks     Ryan Herring RPH  10/11/2024   12:36 EDT

## 2024-11-18 ENCOUNTER — SPECIALTY PHARMACY (OUTPATIENT)
Dept: NEUROLOGY | Facility: CLINIC | Age: 39
End: 2024-11-18
Payer: COMMERCIAL

## 2024-11-18 NOTE — PROGRESS NOTES
Specialty Pharmacy Refill Coordination Note     Robert is a 38 y.o. male contacted today regarding refills of Nurtec specialty medication(s).    Reviewed and verified with patient:       Specialty medication(s) and dose(s) confirmed: yes    Refill Questions      Flowsheet Row Most Recent Value   Changes to allergies? No   Changes to medications? No   New conditions or infections since last clinic visit No   Unplanned office visit, urgent care, ED, or hospital admission in the last 4 weeks  No   How does patient/caregiver feel medication is working? Very good   Financial problems or insurance changes  No   Since the previous refill, were any specialty medication doses or scheduled injections missed or delayed?  No   Does this patient require a clinical escalation to a pharmacist? No            Delivery Questions      Flowsheet Row Most Recent Value   Delivery method UPS   Delivery address verified with patient/caregiver? Yes   Delivery address Home   Number of medications in delivery 1   Medication(s) being filled and delivered Rimegepant Sulfate (Nurtec)   Doses left of specialty medications 4-5   Copay verified? Yes   Copay amount $0   Copay form of payment No copayment ($0)   Ship Date 11/19   Delivery Date 11/20   Signature Required No                   Follow-up: 21 day(s)     Gail Fuentes, Pharmacy Technician  Specialty Pharmacy Technician

## 2024-12-19 ENCOUNTER — SPECIALTY PHARMACY (OUTPATIENT)
Dept: NEUROLOGY | Facility: CLINIC | Age: 39
End: 2024-12-19
Payer: COMMERCIAL

## 2024-12-19 NOTE — PROGRESS NOTES
Specialty Pharmacy Refill Coordination Note     Robert is a 39 y.o. male contacted today regarding refills of Nurtec and Sumatriptan specialty medication(s).    Reviewed and verified with patient:       Specialty medication(s) and dose(s) confirmed: yes    Refill Questions      Flowsheet Row Most Recent Value   Changes to allergies? No   Changes to medications? No   New conditions or infections since last clinic visit No   Unplanned office visit, urgent care, ED, or hospital admission in the last 4 weeks  No   How does patient/caregiver feel medication is working? Very good   Financial problems or insurance changes  No   Since the previous refill, were any specialty medication doses or scheduled injections missed or delayed?  No   Does this patient require a clinical escalation to a pharmacist? No            Delivery Questions      Flowsheet Row Most Recent Value   Delivery method UPS   Delivery address verified with patient/caregiver? Yes   Delivery address Home   Number of medications in delivery 2   Medication(s) being filled and delivered SUMAtriptan Succinate (IMITREX), Rimegepant Sulfate (Nurtec)   Doses left of specialty medications 6   Copay verified? Yes   Copay amount $10 Sumatriptan   Copay form of payment Credit/debit on file   Ship Date 12/26   Delivery Date 12/27   Signature Required No                   Follow-up: 21 day(s)     Gail Fuentes, Pharmacy Technician  Specialty Pharmacy Technician

## 2025-01-16 RX ORDER — BACLOFEN 10 MG/1
10 TABLET ORAL 2 TIMES DAILY PRN
Qty: 60 TABLET | Refills: 1 | Status: SHIPPED | OUTPATIENT
Start: 2025-01-16

## 2025-01-22 ENCOUNTER — SPECIALTY PHARMACY (OUTPATIENT)
Dept: NEUROLOGY | Facility: CLINIC | Age: 40
End: 2025-01-22
Payer: COMMERCIAL

## 2025-01-22 NOTE — PROGRESS NOTES
Specialty Pharmacy Refill Coordination Note     Robert is a 39 y.o. male contacted today regarding refills of Nurtec specialty medication(s).    Reviewed and verified with patient:       Specialty medication(s) and dose(s) confirmed: yes    Refill Questions      Flowsheet Row Most Recent Value   Changes to allergies? No   Changes to medications? No   New conditions or infections since last clinic visit No   Unplanned office visit, urgent care, ED, or hospital admission in the last 4 weeks  No   How does patient/caregiver feel medication is working? Very good   Financial problems or insurance changes  No   Since the previous refill, were any specialty medication doses or scheduled injections missed or delayed?  No   Does this patient require a clinical escalation to a pharmacist? No            Delivery Questions      Flowsheet Row Most Recent Value   Delivery method UPS   Delivery address verified with patient/caregiver? Yes   Delivery address Home   Number of medications in delivery 1   Medication(s) being filled and delivered Rimegepant Sulfate (Nurtec)   Doses left of specialty medications 6   Copay verified? Yes   Copay amount $0   Copay form of payment No copayment ($0)   Ship Date 1/23   Delivery Date Selection 01/24/25   Signature Required No                   Follow-up: 21 day(s)     Gail Fuentes, Pharmacy Technician  Specialty Pharmacy Technician

## 2025-02-20 ENCOUNTER — SPECIALTY PHARMACY (OUTPATIENT)
Dept: NEUROLOGY | Facility: CLINIC | Age: 40
End: 2025-02-20
Payer: COMMERCIAL

## 2025-02-20 RX ORDER — NAPROXEN 500 MG/1
500 TABLET ORAL 2 TIMES DAILY PRN
Qty: 60 TABLET | Refills: 2 | Status: SHIPPED | OUTPATIENT
Start: 2025-02-20

## 2025-02-20 NOTE — PROGRESS NOTES
Specialty Pharmacy Patient Management Program  Refill Outreach     Robert was contacted today regarding refills of their medication(s).    Refill Questions      Flowsheet Row Most Recent Value   Changes to allergies? No   Changes to medications? No   New conditions or infections since last clinic visit No   Unplanned office visit, urgent care, ED, or hospital admission in the last 4 weeks  No   How does patient/caregiver feel medication is working? Very good   Financial problems or insurance changes  No   Since the previous refill, were any specialty medication doses or scheduled injections missed or delayed?  No   Does this patient require a clinical escalation to a pharmacist? No            Delivery Questions      Flowsheet Row Most Recent Value   Delivery method UPS   Delivery address verified with patient/caregiver? Yes   Delivery address Home   Number of medications in delivery 5   Medication(s) being filled and delivered Ondansetron HCl (ZOFRAN), Rimegepant Sulfate (Nurtec), SUMAtriptan Succinate (IMITREX), Baclofen (LIORESAL), Naproxen (NAPROSYN)   Doses left of specialty medications 6   Copay verified? Yes   Copay amount $40   Copay form of payment Credit/debit on file   Delivery Date Selection 02/21/25   Signature Required No                 Follow-up: 21 day(s)     Gail Fuentes, Pharmacy Technician  2/20/2025  10:03 EST

## 2025-03-10 ENCOUNTER — TELEPHONE (OUTPATIENT)
Dept: PAIN MEDICINE | Facility: CLINIC | Age: 40
End: 2025-03-10

## 2025-03-10 NOTE — TELEPHONE ENCOUNTER
Provider: THU    Caller: Robert Cullen    Relationship to Patient: Self    Phone Number: [phone]    Reason for Call: PLEASE SEE PATIENT MESSAGE 03/08/25. PATIENT IS ASKING WHAT DR. ANDINO RECOMMENDS FOR RECENT LOW BACK PAIN FLARE UP.

## 2025-03-10 NOTE — TELEPHONE ENCOUNTER
After reviewing my note, he and I discussed wearing his back brace if he has a flare.  Please remind him of that.  Also, can you see when we have a clinic opening to evaluate him and if we can get him scheduled?

## 2025-03-10 NOTE — TELEPHONE ENCOUNTER
Patient states he did start the steroid pack, it is a 5 day prescription and he does feel like there may be slight improvement, he is using the back brace as well. He asked if there was anything that could be prescribed for the pain for these infrequent flare ups, I told him that typically that would not be prescribed over the phone and would require an office visit. He stated he does have some Baclofen at home as well. He is asking if he should start PT? I did schedule him appointment for Thursday with RONAL Johnson

## 2025-03-11 NOTE — TELEPHONE ENCOUNTER
He can/should start PT if he can tolerate it.  Our plan for his pain flares was to start a steroid or get him set up for a steroid injection.  Baclofen probably won't hurt, but could make him sleepy.  We'll be able to make better recommendations on Thursday when we see him.

## 2025-03-12 NOTE — TELEPHONE ENCOUNTER
Sounds good.  It looks like Dr. Torres placed a referral.  Keila, could you check to see if it's going where he wants?

## 2025-03-13 ENCOUNTER — OFFICE VISIT (OUTPATIENT)
Dept: PAIN MEDICINE | Facility: CLINIC | Age: 40
End: 2025-03-13
Payer: COMMERCIAL

## 2025-03-13 VITALS
HEIGHT: 70 IN | BODY MASS INDEX: 26.56 KG/M2 | TEMPERATURE: 98.7 F | SYSTOLIC BLOOD PRESSURE: 121 MMHG | HEART RATE: 86 BPM | RESPIRATION RATE: 18 BRPM | OXYGEN SATURATION: 98 % | DIASTOLIC BLOOD PRESSURE: 83 MMHG | WEIGHT: 185.5 LBS

## 2025-03-13 DIAGNOSIS — M53.3 SACROILIAC JOINT DYSFUNCTION OF BOTH SIDES: ICD-10-CM

## 2025-03-13 DIAGNOSIS — M51.26 DISPLACEMENT OF LUMBAR INTERVERTEBRAL DISC WITHOUT MYELOPATHY: Primary | ICD-10-CM

## 2025-03-13 RX ORDER — KETOROLAC TROMETHAMINE 30 MG/ML
60 INJECTION, SOLUTION INTRAMUSCULAR; INTRAVENOUS ONCE
Status: COMPLETED | OUTPATIENT
Start: 2025-03-13 | End: 2025-03-13

## 2025-03-13 RX ORDER — KETOROLAC TROMETHAMINE 10 MG/1
10 TABLET, FILM COATED ORAL EVERY 6 HOURS PRN
Qty: 20 TABLET | Refills: 0 | Status: SHIPPED | OUTPATIENT
Start: 2025-03-13

## 2025-03-13 RX ORDER — MELOXICAM 15 MG/1
TABLET ORAL
COMMUNITY
Start: 2025-03-03

## 2025-03-13 RX ORDER — METHYLPREDNISOLONE 4 MG/1
TABLET ORAL
COMMUNITY
Start: 2025-03-09

## 2025-03-13 RX ORDER — KETOROLAC TROMETHAMINE 30 MG/ML
60 INJECTION, SOLUTION INTRAMUSCULAR; INTRAVENOUS ONCE
Status: DISCONTINUED | OUTPATIENT
Start: 2025-03-13 | End: 2025-03-13

## 2025-03-13 RX ADMIN — KETOROLAC TROMETHAMINE 60 MG: 30 INJECTION, SOLUTION INTRAMUSCULAR; INTRAVENOUS at 15:50

## 2025-03-13 NOTE — PROGRESS NOTES
CHIEF COMPLAINT  Follow-up for back pain.      Subjective   Robert Cullen is a 39 y.o. male  who presents for follow-up.  He has a history of intermittent low back pain.  Patient was last evaluated on 3/28/2024 with Dr. Tatum for history of low back pain.  This patient tends to have episodic flares of pain that is usually managed with physical therapy, lumbar bracing and steroid Dosepak.  He contacted the clinic on 3/10/2025 reporting that he bent over in his closet and coughed and sneezed at the same time and had immediate acute onset of pain.  Patient contacted a telehealth medical provider on Saturday evening and was prescribed a Medrol Dosepak that he started on Sunday morning which is beginning to provide benefit.  Denies lower extremity radiculopathy and also denies bladder or bowel incontinence.  Denies any saddle anesthesia.  Patient states that during this current flare of pain that he was unable to bear weight due to severe pain within the lumbar spine.  Continues to have these flares approximately once per year.    Pain today 3/10 VAS in severity.        Back Pain  This is a recurrent problem. The current episode started more than 1 year ago. The problem occurs constantly. Progression since onset: slowly improving. The pain is present in the lumbar spine. The quality of the pain is described as burning, shooting and stabbing. The pain does not radiate. The pain is at a severity of 3/10. The pain is moderate. The pain is Worse during the day. The symptoms are aggravated by position and standing (walking/activity). Associated symptoms include weakness. Pertinent negatives include no numbness. He has tried ice, NSAIDs and home exercises for the symptoms.        PEG Assessment   What number best describes your pain on average in the past week?8  What number best describes how, during the past week, pain has interfered with your enjoyment of life?8  What number best describes how, during the past week,  "pain has interfered with your general activity?  8    Review of Pertinent Medical Data ---  Review of Dr. Aydee Sellers 3/28/2024 note: Explained that his pain failure seem to be triggered by his disc displacement but that likely causes the sacroiliac joint to flare.  The patient will contact us if he has a pain flare and we will evaluate to determine which injection would be most likely to help him at that time.  Advised to consider using his back brace when he is having a flare of his back pain.  If we cannot get him scheduled soon enough for an injection when having a flare we may consider an IM steroid injection versus a steroid Dosepak versus management with an NSAID and muscle relaxant.  Follow-up as needed.                    The following portions of the patient's history were reviewed and updated as appropriate: allergies, current medications, past family history, past medical history, past social history, past surgical history, and problem list.    Review of Systems   Gastrointestinal:  Negative for constipation and diarrhea.   Genitourinary:  Negative for difficulty urinating.   Musculoskeletal:  Positive for back pain.   Neurological:  Positive for weakness. Negative for numbness.   Psychiatric/Behavioral:  Negative for sleep disturbance and suicidal ideas. The patient is not nervous/anxious.      I have reviewed and confirmed the accuracy of the ROS as documented by the MA/LPN/RN RONAL Lauren  Vitals:    03/13/25 1456   BP: 121/83   Pulse: 86   Resp: 18   Temp: 98.7 °F (37.1 °C)   SpO2: 98%   Weight: 84.1 kg (185 lb 8 oz)   Height: 177.8 cm (70\")   PainSc: 3    PainLoc: Back         Objective   Physical Exam  Vitals and nursing note reviewed.   Constitutional:       Appearance: Normal appearance. He is normal weight.   HENT:      Head: Normocephalic.   Neurological:      Mental Status: He is alert and oriented to person, place, and time.      Cranial Nerves: Cranial nerves 2-12 are intact.      " Sensory: Sensation is intact.      Motor: Motor function is intact.      Gait: Gait is intact.   Psychiatric:         Mood and Affect: Mood normal.         Behavior: Behavior normal.         Thought Content: Thought content normal.         Judgment: Judgment normal.       Tobacco Use: Low Risk  (3/13/2025)    Patient History     Smoking Tobacco Use: Never     Smokeless Tobacco Use: Never     Passive Exposure: Not on file     PHQ-2 Depression Screening  Little interest or pleasure in doing things? Not at all   Feeling down, depressed, or hopeless? Not at all   PHQ-2 Total Score 0           Assessment & Plan   Diagnoses and all orders for this visit:    1. Displacement of lumbar intervertebral disc without myelopathy (Primary)  -     Discontinue: ketorolac (TORADOL) injection 60 mg  -     ketorolac (TORADOL) injection 60 mg  -     ketorolac (TORADOL) 10 MG tablet; Take 1 tablet by mouth Every 6 (Six) Hours As Needed for Moderate Pain.  Dispense: 20 tablet; Refill: 0  -     MRI Lumbar Spine Without Contrast; Future  -     Ambulatory Referral to Neurosurgery    2. Sacroiliac joint dysfunction of both sides  -     Discontinue: ketorolac (TORADOL) injection 60 mg  -     ketorolac (TORADOL) injection 60 mg        Robert BRITTANY Cullen reports a pain score of 3.  Given his pain assessment as noted, treatment options were discussed and the following options were decided upon as a follow-up plan to address the patient's pain: continuation of current treatment plan for pain, prescription for non-opiod analgesics, and use of non-medical modalities (ice, heat, stretching and/or behavior modifications).      --- Follow-up as needed.  --- The patient has 1 more day to finish of his Medrol Dosepak and states that he feels the pain is improving but does feel that he still having some increased soreness.  I do recommend proceeding with ketorolac 60 mg IM injection on today and will send in a 5-day supply of ketorolac for him to continue  with as needed.  I have advised the patient to not take any type of over-the-counter nonprescription strength NSAID.  --- I am ordering a custom fitted back brace due to a waist-to-hip ration disparity that requires more than minimal self-adjustments to provide an individualized fit.  The brace is needed to reduce pain by restricting mobility of the trunk.  ----The patient has already had an order placed for physical therapy and he is scheduled to start that next week which I agree with.  May be candidate for further evaluation and treatment with traction.  --- Patient would also like to proceed with surgical referral just to discuss his options.  Referral has been placed to Dr Farhan Navas.  I have ordered a new lumbar MRI so that he will have updated imaging when seeing the surgeon.  --- I have discussed with the patient that upon completion of the oral Toradol that if he has not noted improvement of painful symptoms that I would proceed with another Medrol Dosepak.      I spent 58 minutes caring for Robert on this date of service. This time includes time spent by me in the following activities: preparing for the visit, reviewing tests, performing a medically appropriate examination and/or evaluation, counseling and educating the patient/family/caregiver, referring and communicating with other health care professionals, documenting information in the medical record, independently interpreting results and communicating that information with the patient/family/caregiver, care coordination, ordering medications, ordering test(s), obtaining a separately obtained history, and reviewing a separately obtained history             Dictated utilizing Dragon dictation.

## 2025-03-26 ENCOUNTER — TELEPHONE (OUTPATIENT)
Dept: PAIN MEDICINE | Facility: CLINIC | Age: 40
End: 2025-03-26

## 2025-03-26 NOTE — TELEPHONE ENCOUNTER
Caller: Robert Cullen    Relationship: Self    Best call back number: [phone] (home)       What is the best time to reach you: ANYTIME    Who are you requesting to speak with (clinical staff, provider,  specific staff member): CLINICAL     Do you know the name of the person who called: INCOMING PATIENT CALL     What was the call regarding: INSURANCE DENIED MRI DUE TO HIM NOT HAVING MEDICATION OR PT FOR 6 WEEKS OR HE WOULD HAVE TO -800 DOLLARS OUT OF POCKET.     PATIENT WOULD LIKE TO KNOW HOW JACKIE DYKES WANTS TO PROCEED.       Is it okay if the provider responds through MyChart:

## 2025-03-27 ENCOUNTER — OFFICE VISIT (OUTPATIENT)
Dept: NEUROLOGY | Facility: CLINIC | Age: 40
End: 2025-03-27
Payer: COMMERCIAL

## 2025-03-27 VITALS
DIASTOLIC BLOOD PRESSURE: 72 MMHG | HEIGHT: 70 IN | BODY MASS INDEX: 26.92 KG/M2 | HEART RATE: 81 BPM | WEIGHT: 188 LBS | SYSTOLIC BLOOD PRESSURE: 120 MMHG

## 2025-03-27 DIAGNOSIS — M54.50 ACUTE ON CHRONIC LOW BACK PAIN: ICD-10-CM

## 2025-03-27 DIAGNOSIS — G89.29 ACUTE ON CHRONIC LOW BACK PAIN: ICD-10-CM

## 2025-03-27 DIAGNOSIS — G43.009 MIGRAINE WITHOUT AURA AND WITHOUT STATUS MIGRAINOSUS, NOT INTRACTABLE: Primary | ICD-10-CM
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
## 2025-03-30 NOTE — TELEPHONE ENCOUNTER
Relayed information to patient. He states things are improving day by day, he will keep appointment for tomorrow. He agrees to PT and would like to go to [facility] in [facility] with ALYCIA Fonseca.    N/A

## 2025-04-09 ENCOUNTER — SPECIALTY PHARMACY (OUTPATIENT)
Dept: NEUROLOGY | Facility: CLINIC | Age: 40
End: 2025-04-09
Payer: COMMERCIAL

## 2025-04-09 RX ORDER — BACLOFEN 10 MG/1
10 TABLET ORAL 2 TIMES DAILY PRN
Qty: 60 TABLET | Refills: 2 | Status: SHIPPED | OUTPATIENT
Start: 2025-04-09

## 2025-04-09 NOTE — PROGRESS NOTES
Specialty Pharmacy Patient Management Program  Neurology Reassessment     Robert Cullen is a 39 y.o. male with chronic migraines seen by a Neurology provider and enrolled in the Neurology Patient Management program offered by [facility].  A follow-up outreach was conducted, including assessment of continued therapy appropriateness, medication adherence, and side effect incidence and management for rimegepant (Nurtec).     Changes to Insurance Coverage or Financial Support  No changes       Relevant Past Medical History and Comorbidities  Relevant medical history and concomitant health conditions were discussed with the patient. The patient's chart has been reviewed for relevant past medical history and comorbid health conditions and updated as necessary.   Past Medical History:   Diagnosis Date    Insomnia     Migraine     Psoriasis      Social History     Socioeconomic History    Marital status:    Tobacco Use    Smoking status: Never    Smokeless tobacco: Never   Vaping Use    Vaping status: Never Used   Substance and Sexual Activity    Alcohol use: No    Drug use: Never    Sexual activity: Defer     Problem list reviewed by Anne Trammell RPH on 4/9/2025 at  8:50 AM      Hospitalizations and Urgent Care Since Last Assessment  ED Visits, Admissions, or Hospitalizations: None   Urgent Office Visits: None       Allergies  Known allergies and reactions were discussed with the patient. The patient's chart has been reviewed for allergy information and updated as necessary.   No Known Allergies  Allergies reviewed by Anne Trammell RPH on 4/9/2025 at  8:50 AM      Relevant Laboratory Values      Lab Assessment  The above labs have been reviewed. No dose adjustments are needed for the specialty medication(s) based on the labs.       Current Medication List  This medication list has been reviewed with the patient and evaluated for any interactions or necessary  modifications/recommendations, and updated to include all prescription medications, OTC medications, and supplements the patient is currently taking.  This list reflects what is contained in the patient's profile, which has also been marked as reviewed to communicate to other providers it is the most up to date version of the patient's current medication therapy.     Current Outpatient Medications:     baclofen (LIORESAL) 10 MG tablet, Take 1 tablet by mouth 2 (Two) Times a Day As Needed for Muscle Spasms., Disp: 60 tablet, Rfl: 2    glycopyrrolate (ROBINUL) 1 MG tablet, , Disp: , Rfl: 0    ketorolac (TORADOL) 10 MG tablet, Take 1 tablet by mouth Every 6 (Six) Hours As Needed for Moderate Pain., Disp: 20 tablet, Rfl: 0    meloxicam (MOBIC) 15 MG tablet, TAKE 1 TABLET BY MOUTH AFTER A MEAL ONCE DAILY, Disp: , Rfl:     methylPREDNISolone (MEDROL) 4 MG dose pack, follow package directions (Patient not taking: Reported on 3/27/2025), Disp: , Rfl:     Nurtec 75 MG dispersible tablet, Take 1 tablet by mouth Every Other Day., Disp: 16 tablet, Rfl: 11    Risankizumab-rzaa (SKYRIZI SC), Inject  under the skin into the appropriate area as directed Every 3 (Three) Months., Disp: , Rfl:     SUMAtriptan (IMITREX) 100 MG tablet, Take 1 tablet by mouth 1 (One) Time As Needed for Migraine. May repeat dose one time in 2 hours if headache not relieved. Max of 2 tablets in 24 hours., Disp: 12 tablet, Rfl: 4  No current facility-administered medications for this visit.    Medicines reviewed by Anne Trammell RPH on 4/9/2025 at  8:50 AM    Drug Interactions  None at this time        Adverse Drug Reactions  Medication tolerability: Tolerating with no to minimal ADRs  Medication plan: Continue therapy with normal follow-up  Plan for ADR Management: No ADRs reported during this patient encounter      Adherence, Self-Administration, and Current Therapy Problems  Adherence related patient's specialty therapy was discussed with the patient.  The Adherence segment of this outreach has been reviewed and updated.   Adherence Questions  Linked Medication(s) Assessed: Rimegepant Sulfate (Nurtec)  On average, how many doses/injections does the patient miss per month?: 0  What are the identified reasons for non-adherence or missed doses? : no problems identified  What is the estimated medication adherence level?: %  Based on the patient/caregiver response and refill history, does this patient require an MTP to track adherence improvements?: no    Additional Barriers to Patient Self-Administration: None  Methods for Supporting Patient Self-Administration: n/a    Recently Close Medication Therapy Problems  No medication therapy recommendations to display  Open Medication Therapy Problems  No medication therapy recommendations to display     Goals of Therapy  Goals related to the patient's specialty therapy was discussed with the patient. The Patient Goals segment of this outreach has been reviewed and updated.    Goals Addressed Today        Specialty Pharmacy General Goal      Reduce frequency of headaches and migraines. Patient reports prior to starting Nurtec ~9/2023, 10 days of headaches within the last 30 days. 3 were severe migrainous type headaches.     4/9/25: Patient currently prescribed rimegepant 75 mg every other day, sumatriptan prn, naproxen prn, baclofen 10 mg bid prn muscle spasms, and ondansetron prn. He estimated about 5 migraines monthly that are fully resolved with use of prn agents. He reports no new side effects or concerns at this time.     10/11/24: Patient reports the same continued improvement since 4/22/24 assessment with about 8 headaches and 2 severe migraines per month and ~60% severity reduction in symptoms on average. No side effects or concerns.                Quality of Life Assessment   Quality of Life related to the patient's enrollment in the patient management program and services provided was discussed with the  patient. The QOL segment of this outreach has been reviewed and updated.   Quality of Life Improvement Scale: 9-A good deal better      Reassessment Plan & Follow-Up  Medication Therapy Changes: Continue rimegepant 75 mg ODT every other day, sumatriptan 100 mg as needed (max 2 tablets per 24 hours), baclofen 10 mg twice daily as needed for muscle spasms, and ondansetron as needed for nausea  Related Plans, Therapy Recommendations, or Issues to Be Addressed: Nothing further to be addressed at this time.  Pharmacist to perform regular reassessments no more than (6) months from the previous assessment.  Care Coordinator to set up future refill outreaches, coordinate prescription delivery, and escalate clinical questions to pharmacist.     Attestation  Therapeutic appropriateness: Appropriate  I attest the patient was actively involved in and has agreed to the above plan of care. If the prescribed therapy is at any point deemed not appropriate based on the current or future assessments, a consultation will be initiated with the patient's specialty care provider to determine the best course of action. The revised plan of therapy will be documented along with any additional patient education provided. Discussed aforementioned material with patient in person.    Anne Trammell RPH  4/9/2025  08:58 EDT

## 2025-04-09 NOTE — PROGRESS NOTES
Specialty Pharmacy Refill Coordination Note     Robert is a 39 y.o. male contacted today regarding refills of his specialty medication(s).    Specialty medication(s) and dose(s) confirmed: rimegepant (Nurtec); Additional refill of sumatriptan & baclofen  Changes to medications: no  Changes to insurance: no  Reviewed and verified with patient:  Allergies  Meds  Problems         Refill Questions      Flowsheet Row Most Recent Value   Changes to allergies? No   Changes to medications? No   New conditions or infections since last clinic visit No   Unplanned office visit, urgent care, ED, or hospital admission in the last 4 weeks  No   How does patient/caregiver feel medication is working? Very good   Financial problems or insurance changes  No   Since the previous refill, were any specialty medication doses or scheduled injections missed or delayed?  No   Does this patient require a clinical escalation to a pharmacist? No            Delivery Questions      Flowsheet Row Most Recent Value   Delivery method UPS   Delivery address verified with patient/caregiver? Yes   Delivery address Home   Number of medications in delivery 3   Medication(s) being filled and delivered Rimegepant Sulfate (Nurtec), SUMAtriptan Succinate (IMITREX), Baclofen (LIORESAL)   Doses left of specialty medications 1   Copay verified? Yes   Copay amount $20   Copay form of payment Credit/debit on file   Delivery Date Selection 04/10/25   Signature Required No   Do you consent to receive electronic handouts?  Yes                Follow-up: 3 week(s)     Anne Trammell RPH  4/9/2025   08:57 EDT

## 2025-05-28 ENCOUNTER — SPECIALTY PHARMACY (OUTPATIENT)
Dept: NEUROLOGY | Facility: CLINIC | Age: 40
End: 2025-05-28
Payer: COMMERCIAL

## 2025-05-28 NOTE — PROGRESS NOTES
Specialty Pharmacy Patient Management Program  Refill Outreach     Robert was contacted today regarding refills of their medication(s).    Refill Questions      Flowsheet Row Most Recent Value   Changes to allergies? No   Changes to medications? No   New conditions or infections since last clinic visit No   Unplanned office visit, urgent care, ED, or hospital admission in the last 4 weeks  No   How does patient/caregiver feel medication is working? Very good   Financial problems or insurance changes  No   Since the previous refill, were any specialty medication doses or scheduled injections missed or delayed?  No   Does this patient require a clinical escalation to a pharmacist? No            Delivery Questions      Flowsheet Row Most Recent Value   Delivery method UPS   Delivery address verified with patient/caregiver? Yes   Delivery address Home   Other address preferred NA   Number of medications in delivery 3   Medication(s) being filled and delivered Rimegepant Sulfate (Nurtec), SUMAtriptan Succinate (IMITREX), Baclofen (LIORESAL)   Doses left of specialty medications a few   Copay verified? Yes   Copay amount $20   Copay form of payment Credit/debit on file   Delivery Date Selection 05/29/25   Do you consent to receive electronic handouts?  Yes                 Follow-up: 25 day(s)     Karol Mcgee  5/28/2025  11:26 EDT

## 2025-07-02 ENCOUNTER — SPECIALTY PHARMACY (OUTPATIENT)
Dept: NEUROLOGY | Facility: CLINIC | Age: 40
End: 2025-07-02
Payer: COMMERCIAL

## 2025-07-02 NOTE — PROGRESS NOTES
Specialty Pharmacy Patient Management Program  Refill Outreach     Robert was contacted today regarding refills of their medication(s).    Refill Questions      Flowsheet Row Most Recent Value   Changes to allergies? No   Changes to medications? No   New conditions or infections since last clinic visit No   Unplanned office visit, urgent care, ED, or hospital admission in the last 4 weeks  No   How does patient/caregiver feel medication is working? Very good   Financial problems or insurance changes  No   Since the previous refill, were any specialty medication doses or scheduled injections missed or delayed?  No   Does this patient require a clinical escalation to a pharmacist? No            Delivery Questions      Flowsheet Row Most Recent Value   Delivery method UPS   Delivery address verified with patient/caregiver? Yes   Delivery address Home   Other address preferred NA   Number of medications in delivery 3   Medication(s) being filled and delivered Baclofen (LIORESAL), Rimegepant Sulfate (Nurtec), SUMAtriptan Succinate (IMITREX)   Doses left of specialty medications a few   Copay verified? Yes   Copay amount $20   Copay form of payment Credit/debit on file   Delivery Date Selection 07/03/25   Signature Required No   Do you consent to receive electronic handouts?  Yes                 Follow-up: 25 day(s)     Karol Mcgee  7/2/2025  10:54 EDT

## 2025-08-01 ENCOUNTER — SPECIALTY PHARMACY (OUTPATIENT)
Dept: NEUROLOGY | Facility: CLINIC | Age: 40
End: 2025-08-01
Payer: COMMERCIAL

## 2025-08-01 NOTE — PROGRESS NOTES
Specialty Pharmacy Patient Management Program  Refill Outreach     Robert was contacted today regarding refills of their medication(s).    Refill Questions      Flowsheet Row Most Recent Value   Changes to allergies? No   Changes to medications? No   New conditions or infections since last clinic visit No   Unplanned office visit, urgent care, ED, or hospital admission in the last 4 weeks  No   How does patient/caregiver feel medication is working? Very good   Financial problems or insurance changes  No   Since the previous refill, were any specialty medication doses or scheduled injections missed or delayed?  No   Does this patient require a clinical escalation to a pharmacist? No            Delivery Questions      Flowsheet Row Most Recent Value   Delivery method UPS   Delivery address verified with patient/caregiver? Yes   Delivery address Home   Other address preferred NA   Number of medications in delivery 3   Medication(s) being filled and delivered Ondansetron HCl (ZOFRAN), Rimegepant Sulfate (Nurtec), SUMAtriptan Succinate (IMITREX)   Doses left of specialty medications a few   Copay verified? Yes   Copay amount $20   Copay form of payment Credit/debit on file   Delivery Date Selection 08/05/25   Signature Required No   Do you consent to receive electronic handouts?  Yes                 Follow-up: 25 day(s)     Karol Mcgee  8/1/2025  10:16 EDT